# Patient Record
Sex: MALE | Race: WHITE | NOT HISPANIC OR LATINO | URBAN - METROPOLITAN AREA
[De-identification: names, ages, dates, MRNs, and addresses within clinical notes are randomized per-mention and may not be internally consistent; named-entity substitution may affect disease eponyms.]

---
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
## 2021-01-22 ENCOUNTER — RECORDS - HEALTHEAST (OUTPATIENT)
Dept: ADMINISTRATIVE | Facility: OTHER | Age: 21
End: 2021-01-22

## 2021-04-22 ENCOUNTER — RECORDS - HEALTHEAST (OUTPATIENT)
Dept: ADMINISTRATIVE | Facility: OTHER | Age: 21
End: 2021-04-22

## 2021-04-22 LAB
ALBUMIN (URINE) MG/SPEC: 60.9 MG/L
ALBUMIN/CREATININE RATIO: 50.76 MG/G (ref 0–30)
CHOLEST SERPL-MCNC: 189 MG/DL (ref 42–199)
CREATININE (URINE): 119.98 MG/DL (ref 40–278)
HDLC SERPL-MCNC: 53 MG/DL
LDLC SERPL CALC-MCNC: 123 MG/DL (ref 0–129)
TRIGLYCERIDES (HISTORICAL CONVERSION): 188 MG/DL (ref 0–199)

## 2021-04-30 ENCOUNTER — RECORDS - HEALTHEAST (OUTPATIENT)
Dept: HEALTH INFORMATION MANAGEMENT | Facility: CLINIC | Age: 21
End: 2021-04-30

## 2021-05-25 ENCOUNTER — RECORDS - HEALTHEAST (OUTPATIENT)
Dept: ADMINISTRATIVE | Facility: CLINIC | Age: 21
End: 2021-05-25

## 2021-05-30 VITALS — WEIGHT: 143 LBS

## 2021-05-30 VITALS — WEIGHT: 147.6 LBS

## 2021-05-30 NOTE — TELEPHONE ENCOUNTER
[facility] ([facility]) calling with critical lab. Glucose 49. Drawn at 11:11am. Pt is diabetic. On call doctor (Dr. Ingram) paged at 908 PM.   Pt was called at 910 PM. No answer. Attempted call again at 938 pm. LMTCB X2.     Aristides Chirinos RN [facility] Triage/Medication Refill

## 2021-05-30 NOTE — PROGRESS NOTES
[facility] Well Child Check    ASSESSMENT & PLAN  Quentin Shen is a 19 y.o. who has normal growth and normal development.    Diagnoses and all orders for this visit:    Health care maintenance  -     Meningococcal MCV4P  -     Hepatitis A vaccine Ped/Adol 2 dose IM (18yr & under)    Type 1 diabetes mellitus with hyperglycemia (H)  -     Glycosylated Hemoglobin A1c  -     Comprehensive Metabolic Panel  -     HM2(CBC w/o Differential)  -     Lipid Cascade RANDOM  -     Microalbumin, Random Urine    Mild persistent asthma without complication    Attention deficit hyperactivity disorder (ADHD), unspecified ADHD type    Other orders  -     HPV vaccine 9 valent 3 dose IM        Return to clinic in 1 year for a Well Child Check or sooner as needed    IMMUNIZATIONS/LABS  Immunizations were reviewed and orders were placed as appropriate.  I have discussed the risks and benefits of all of the vaccine components with the patient/parents.  All questions have been answered.    REFERRALS  Dental:  Recommend routine dental care as appropriate., The patient has already established care with a dentist.  Other:  No additional referrals were made at this time.    ANTICIPATORY GUIDANCE  I have reviewed age appropriate anticipatory guidance.    HEALTH HISTORY  Do you have any concerns that you'd like to discuss today?: No concerns   He has history of type 1 diabetes, autism, pervasive develop mental disorder, ADHD, adjustment disorder as well as asthma.  [facility]  He has an insulin pump.  He reports that he last saw his endocrinologist about 3 months ago.  Last records that we received was from November of last year however.  He reports that his blood sugars have been doing well and he feels that he is doing well with managing his diabetes.  He will be entering his senior year of high school in the fall.  He has been working at [employer] this summer.  He is also exercising  regularly as he prefers to try out for the football team.  He also plays baseball.  He sees a psychiatrist who prescribes his medications for treatment of ADHD.  He reports that he last saw his psychiatrist about 4 weeks ago and sees a psychiatrist about every 3 months.  Patient has history of mild persistent asthma.  Feels that asthma is well controlled with regular use of Flovent inhaler.  Uses albuterol inhaler before exercise and physical activity.  Otherwise does not need to use it frequently on a rescue basis.  Reviewed his medications and allergies.  Review of systems is assessed and is otherwise negative.  No other concerns or questions today.    Refills needed? No    Do you have any forms that need to be filled out? No        Do you have any significant health concerns in your family history?: No  No family history on file.  Since your last visit, have there been any major changes in your family, such as a move, job change, separation, divorce, or death in the family?: No  Has a lack of transportation kept you from medical appointments?: No    Home  Who lives in your home?:  Mom, brother, sister  Social History     Social History Narrative     Not on file     Do you have any concerns about losing your housing?: Yes  Is your housing safe and comfortable?: no  Do you have any trouble with sleep?:  No    Education  What school do you child attend?:  [employer]  What grade are you in?:  12th  How do you perform in school (grades, behavior, attention, homework?: no concerns     Eating  Do you eat regular meals including fruits and vegetables?:  yes  What are you drinking (cow's milk, water, soda, juice, sports drinks, energy drinks, etc)?: cow's milk- whole, water, juice, pop, engery drinks  Have you been worried that you don't have enough food?: No  Do you have concerns about your body or appearance?:  No    Activities  Do you have friends?:  yes  Do you get at least one hour of physical activity per day?:   "yes  How many hours a day are you in front of a screen other than for schoolwork (computer, TV, phone)?:  30 min  What do you do for exercise?:  Works out, bikes  Do you have interest/participate in community activities/volunteers/school sports?: involved in theater at school    MENTAL HEALTH SCREENING  PHQ-2 Total Score: 0 (1/8/2019 10:30 AM)    No data recorded    VISION/HEARING  Vision: Completed. See Results  Hearing:  Completed. See Results     Visual Acuity Screening    Right eye Left eye Both eyes   Without correction: 20/20 20/25-1 20/20   With correction:      Comments: Pass lens plus      TB Risk Assessment:  The patient and/or parent/guardian answer positive to:  patient and/or parent/guardian answer 'no' to all screening TB questions    Dyslipidemia Risk Screening  Have either of your parents or any of your grandparents had a stroke or heart attack before age 55?: No  Any parents with high cholesterol or currently taking medications to treat?: No     Dental  When was the last time you saw the dentist?: 1-3 months ago      Patient Active Problem List   Diagnosis     Type 1 Diabetes Mellitus     Asthma     Inquiry And Counseling: Concern About Behavior Of Child       Drugs  Does the patient use tobacco/alcohol/drugs?:  no    Safety  Does the patient have any safety concerns (peer or home)?:  no  Does the patient use safety belts, helmets and other safety equipment?:  yes    Sex  Have you ever had sex?:  No    MEASUREMENTS  Height:  5' 7.25\" (1.708 m)  Weight: 174 lb 8 oz (79.2 kg)  BMI: Body mass index is 27.13 kg/m .  Blood Pressure: 120/76  Blood pressure percentiles are not available for patients who are 18 years or older.    PHYSICAL EXAM  Physical Exam  Physical Examination: General appearance - alert, well appearing, and in no distress  Mental status - alert, oriented to person, place, and time  Eyes - pupils equal and reactive, extraocular eye movements intact  Ears - bilateral TM's and external ear " canals normal  Nose - normal and patent, no erythema, discharge or polyps  Mouth - mucous membranes moist, pharynx normal without lesions  Neck - supple, no significant adenopathy  Lymphatics - no palpable lymphadenopathy, no hepatosplenomegaly  Chest - clear to auscultation, no wheezes, rales or rhonchi, symmetric air entry  Heart - normal rate, regular rhythm, normal S1, S2, no murmurs, rubs, clicks or gallops  Abdomen - soft, nontender, nondistended, no masses or organomegaly   Male - no penile lesions or discharge, no testicular masses or tenderness, no hernias  Neurological - alert, oriented, normal speech, no focal findings or movement disorder noted  Musculoskeletal - no joint tenderness, deformity or swelling  Extremities - peripheral pulses normal, no pedal edema, no clubbing or cyanosis  Skin - normal coloration and turgor, no rashes, no suspicious skin lesions noted, large tattoo on upper back

## 2021-05-30 NOTE — TELEPHONE ENCOUNTER
----- Message from Regina Almaguer MD sent at 7/23/2019  5:53 PM CDT -----  Please let pt know that his A1c is 10.2 %, which means his diabetes is not under good control. I recommend that he schedule a follow up appointment with his endocrinologist as soon as possible to work on adjustments to improve control of his diabetes.

## 2021-05-30 NOTE — TELEPHONE ENCOUNTER
Pt called in,  Inform the Pt his blood glucose was 49.  Pt states his blood sugar is 129 at this time and he has no hypoglycemia symptom.  No other concern at this time.      Farhan Estrada RN, [facility] Triage/Med Refill 7/19/2019 10:13 PM

## 2021-05-31 VITALS — WEIGHT: 153.38 LBS | BODY MASS INDEX: 24.65 KG/M2 | HEIGHT: 66 IN
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
## 2021-05-31 NOTE — TELEPHONE ENCOUNTER
Spoke with , advised sports physical forms have been completed and are ready for  at the . Pt states he will  on Friday 8/23/2019.
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
## 2021-05-31 NOTE — TELEPHONE ENCOUNTER
Patient dropped off sports physical form for Dr. Almaguer to fill out. Please call when ready to be picked up. [phone].

## 2021-06-01 VITALS — HEIGHT: 67 IN | WEIGHT: 162.1 LBS | BODY MASS INDEX: 25.44 KG/M2

## 2021-06-01 VITALS — BODY MASS INDEX: 26.02 KG/M2 | HEIGHT: 67 IN | WEIGHT: 165.8 LBS

## 2021-06-01 VITALS — BODY MASS INDEX: 25.88 KG/M2 | HEIGHT: 66 IN | WEIGHT: 161 LBS

## 2021-06-02 VITALS — BODY MASS INDEX: 26.39 KG/M2 | WEIGHT: 166 LBS

## 2021-06-02 VITALS — HEIGHT: 67 IN | BODY MASS INDEX: 26.37 KG/M2 | WEIGHT: 168 LBS

## 2021-06-03 VITALS — HEIGHT: 67 IN | WEIGHT: 174.5 LBS | BODY MASS INDEX: 27.39 KG/M2

## 2021-06-03 VITALS — BODY MASS INDEX: 28.29 KG/M2 | WEIGHT: 182 LBS

## 2021-06-08 NOTE — PROGRESS NOTES
Subjective:      Quentin Shen is a 16 y.o. male here with mom for evaluation of sore throat that started last night. Pain with swallowing, appetite decreased but drinking well, no N/V/D, no stomach ache. Does c/o headache along with some mild cold symptoms starting up today. No fevers, chills or body aches, patient low-grade temp in clinic. Has taken some Ibuprofen earlier today for the headache with relief. No other ill contacts at home.  Hx of intermittent asthma, Albuterol prn, is well controlled, has not needed inhaler for quite awhile     Objective:     Vitals:    02/13/17 1709   BP: 112/70   Pulse: 138   Resp: 14   Temp: 100.3  F (37.9  C)   SpO2: 97%       General: Alert, interactive, NAD, cooperative on exam  Eyes: PERRLA, EOMI, conjunctivae clear.   Ears: Right TM; pink and translucent. Left TM; pink and translucent   Nose:  Nasal mucosa erythema and mild inflammation. Clear mucus. No maxillary or frontal sinus tenderness  Mouth/Throat:  Tonsillar hypertrophy, 1+, erythema, no exudate. Uvula midline. Posterior pharynx erythematous with some postnasal drainage.  Mucus membranes pink and moist, free of lesions.  Neck: Supple, symmetrical, trachea midline, no adenopathy   Lungs:   No cough demonstrated. CTA bilaterally, good air movement throughout. No rales, rhonchi or wheezing  Heart:: Regular rate and rhythm, S1, S2 normal, no murmur, click, rub or gallop       Results for orders placed or performed in visit on 02/13/17   Rapid Strep A Screen-Throat   Result Value Ref Range    Rapid Strep A Antigen No Group A Strep detected No Group A Strep detected        Assessment/Plan:      1. Viral respiratory illness    2. Throat pain  - Rapid Strep A Screen-Throat  - Group A Strep, RNA Direct Detection, Throat     I reviewed exam and lab findings with mom. Will contact parents in next 24-48 hours if strep confirmatory test positive, would prescribe antibiotics at that time. Dicussed contagious precautions just  in case. If strep negative, likely viral illness that will resolve spontaneously. Recommended supportive cares; nasal saline, elevating hob, humidified air. Advised plenty of fluids and rest. Tylenol or Ibuprofen prn for fever/discomfort. If symptoms not improved in next 5-7 days, f/u with PCP, sooner if worsening.    -Patient instructions given.

## 2021-06-09 NOTE — PROGRESS NOTES
ASSESSMENT:   1. Influenza-like illness     2. Sore throat  Rapid Strep A Screen-Throat    Influenza A/B Rapid Test    Group A Strep, RNA Direct Detection, Throat      PLAN:  Influenza testing is negative, though brother positive for influenza B and entire family has similar symptoms.I suspect this clinically represents influenza.  He is out of treatment window for antivirals.   Discussed viral etiology of influenza, contagion precautions discussed.  Advised no return to school until  fever-free (temperature <100) for 24 hours.  May use ibuprofen or tylenol for comfort and fever. Proair inhaler every 4-6 hours as needed for cough/wheezing.  Given spacer in clinic and instructed on use by nursing staff.   Advised adequate rest and hydration. F/u with PCP if not improving in 5-7 days, sooner in the ER if signs of respiratory distress or he becomes more ill.       SUBJECTIVE:   Quentin Shen is a 16 y.o. male presents today, with his mother, with 4 days complaint of cough. Cough is mostly dry. He also complains of sore throat, fatigue, temperature up to 100.2, body aches, headache, nasal congestion.  He felt constipated Monday, drank orange juice, and had a good bowel movement. Appetite has been fairly normal.   Sick contacts: entire household has similar symptoms. Has tried ibuprofen with minimal relief.  He is type I diabetic, mom states blood sugars have been normal. His appetite has been fairly normal. He has mild intermittent asthma - used his Proair inhaler twice yesterday which was helpful.    Denies abdominal pain, nausea, vomiting, diarrhea, difficulty breathing, rash    Patient Active Problem List   Diagnosis     Type 1 Diabetes Mellitus     Mild Intermittent Asthma     Inquiry And Counseling: Concern About Behavior Of Child       History   Smoking Status     Never Smoker   Smokeless Tobacco     Never Used       Current Medications:  Current Outpatient Prescriptions on File Prior to Visit   Medication Sig  Dispense Refill     ACETONE, URINE, TEST strip CHECK URINE KETONES FOR ELEVATED BLOOD SUGARS AND OR ILLNESS  11     albuterol (PROVENTIL) 2.5 mg /3 mL (0.083 %) nebulizer solution Take 3 mL (2.5 mg total) by nebulization every 4 (four) hours. X 4 days, then use every 4-6 hours as needed for cough or wheezing 30 vial 1     DULoxetine (CYMBALTA) 20 MG capsule        GLUCAGON 1 mg injection 1 MG INTRAMUSCULAR ONCE  11     hydrocortisone 2.5 % cream Apply small amount to affected skin 2-3 times daily for up to 14 days. 30 g 0     LEVEMIR FLEXTOUCH 100 unit/mL (3 mL) pen        methylphenidate (CONCERTA) 54 MG CR tablet        NOVOLOG PENFILL 100 unit/mL Crtg        ONETOUCH ULTRA TEST Strp        No current facility-administered medications on file prior to visit.        Allergies:   Allergies   Allergen Reactions     Amoxicillin Rash     Penicillins Rash       OBJECTIVE:   Vitals:    03/22/17 1426   BP: 114/64   Pulse: 105   Resp: 20   Temp: 98.8  F (37.1  C)   TempSrc: Oral   SpO2: 100%   Weight: 147 lb 9.6 oz (67 kg)     Physical exam reveals a 16 y.o. male.   Appears ill and fatigued.  Alert, cooperative, answers questions appropriately.   Eyes: no conjunctivitis, lids normal.   Ears:  normal TMs bilaterally  Nose:    Mucosa normal. Clear rhinorrhea.   Mouth:  Mucosa pink and moist. Moderate erythema of posterior pharynx. No exudate or palatal petechiae. Uvula is midline.   Lymph: small anterior cervical LAD  Lungs: Chest is clear, no wheezing, rhonchi, or rales. Symmetric air entry throughout both lung fields.  Heart: regular rate and rhythm, no murmur, rub or gallop  Abdomen: soft, nontender. No masses or hepatosplenomegaly  Skin: pink, warm, dry. No lesions/rash     Recent Results (from the past 24 hour(s))   Rapid Strep A Screen-Throat   Result Value Ref Range    Rapid Strep A Antigen No Group A Strep detected No Group A Strep detected   Influenza A/B Rapid Test   Result Value Ref Range    Influenza  A, Rapid  Antigen No Influenza A antigen detected No Influenza A antigen detected    Influenza B, Rapid Antigen No Influenza B antigen detected No Influenza B antigen detected

## 2021-06-11 NOTE — PROGRESS NOTES
Assessment/Plan:      Visit for Preoperative Exam.     Patient approved for surgery with general or local anesthesia. Labs will be done as indicated. Above recommendations were reviewed with the patient. Follow up as needed. No Cardiology consultation or non-invasive testing. No active cardiac conditions.     Continue current medications.  He will see endocrinologist next week and will discuss insulin adjustments with that provider.    Hemoglobin today is normal.    Subjective:     Scheduled Procedure: Right ankle tib / fib FX  Surgery Date:  TBD  Surgery Location:  TBD  Surgeon:  TBD    17-year-old male comes in today accompanied by his mother for preoperative exam.  He sustained a right distal tibia and fibula fracture.  This occurred on June 8, 2017.  He was seen at the emergency department at [facility] in [place].  Injury occurred while playing baseball.  He was running towards first base and rolled his ankle.  He fell and heard a crack.  He was not able to bear weight and was taken to the emergency room for evaluation.  X-rays showed presence of fractures of the distal tibia and fibula.  He was placed in a splint and was given crutches.  He did have orthopedic follow-up and surgery has been recommended although they are seeking a second opinion before proceeding with surgery.  They are concerned about possibility of infection and healing given his underlying type 1 diabetes.  He is scheduled to see his endocrinologist 1 week from today.  He is currently on Levemir and NovoLog insulin.  He is also on duloxetine and Concerta which is prescribed by his psychiatrist.  He also has history of exercise-induced asthma.  Uses an albuterol inhaler as needed before physical activity.  We reviewed allergies and medications.  He has no other concerns or questions today.    Current Outpatient Prescriptions   Medication Sig Dispense Refill     ACETONE, URINE, TEST strip CHECK URINE KETONES FOR ELEVATED BLOOD SUGARS  AND OR ILLNESS  11     albuterol (PROVENTIL) 2.5 mg /3 mL (0.083 %) nebulizer solution Take 3 mL (2.5 mg total) by nebulization every 4 (four) hours. X 4 days, then use every 4-6 hours as needed for cough or wheezing 30 vial 1     DULoxetine (CYMBALTA) 30 MG capsule TAKE 1 CAPSULE BY MOUTH ONCE A DAY  3     GLUCAGON 1 mg injection 1 MG INTRAMUSCULAR ONCE  11     LEVEMIR FLEXTOUCH 100 unit/mL (3 mL) pen        methylphenidate (CONCERTA) 36 MG CR tablet TK 2 TS PO QD  0     NOVOLOG PENFILL 100 unit/mL Crtg        ONETOUCH ULTRA TEST Strp        HYDROcodone-acetaminophen 5-325 mg per tablet Take 1 tablet by mouth every 6 (six) hours as needed for pain. 40 tablet 0     No current facility-administered medications for this visit.        Allergies   Allergen Reactions     Amoxicillin Rash     Penicillins Rash       Immunization History   Administered Date(s) Administered     DTaP, historic 2001, 2001, 2001, 09/20/2002, 05/26/2006     HPV 9 Valent 03/16/2016, 07/20/2016     Hep A, historic 08/18/2014     Hep B, historic 2001, 2001, 09/20/2002     Hepatitis A, Ped/adol 2 Dose 03/16/2016     HiB, historic 2001, 2001, 09/20/2002     IPV 2001, 2001, 2001, 05/26/2006     Influenza V5d6-19, 10/28/2009     Influenza, inj, historic 11/01/2011     Influenza, seasonal,quad inj 6-35 mos 12/01/2008, 09/28/2009, 10/01/2010     MMR 06/21/2002, 05/26/2006     Meningococcal MCV4P 08/18/2014     Tdap 08/18/2014     Varicella 06/21/2002, 09/06/2007       Patient Active Problem List   Diagnosis     Type 1 Diabetes Mellitus     Mild Intermittent Asthma     Inquiry And Counseling: Concern About Behavior Of Child       Past Medical History:   Diagnosis Date     Mild intermittent asthma      Type 1 diabetes        Social History     Social History     Marital status: Single     Spouse name: N/A     Number of children: N/A     Years of education: N/A     Occupational History     Not on  "file.     Social History Main Topics     Smoking status: Never Smoker     Smokeless tobacco: Never Used     Alcohol use No     Drug use: No     Sexual activity: No     Other Topics Concern     Not on file     Social History Narrative       Past Surgical History:   Procedure Laterality Date     APPENDECTOMY         History of Present Illness  Recent Health  Fever: no  Chills: no  Fatigue: no  Chest Pain: no  Cough: no  Dyspnea: no  Urinary Frequency: no  Nausea: no  Vomiting: no  Diarrhea: no  Abdominal Pain: no  Easy Bruising: no  Lower Extremity Swelling: yes--right leg due to fracture  Poor Exercise Tolerance: no    Pertinent History  Prior Anesthesia: yes  Previous Anesthesia Reaction:  no  Diabetes: yes  Cardiovascular Disease: no  Pulmonary Disease: yes--Asthma  Renal Disease: no  GI Disease: no  Sleep Apnea: no  Thromboembolic Problems: no  Clotting Disorder: no  Bleeding Disorder: no  Transfusion Reaction: no  Impaired Immunity: no  Steroid use in the last 6 months: no  Frequent Aspirin use: no    Family history of no pertinent family history    Social history of there are no concerns regarding care after surgery    After surgery, the patient plans to recover at home with family.    Review of Systems  Pertinent items are noted in HPI.          Objective:         Vitals:    06/12/17 1415   BP: 120/68   Pulse: 90   Temp: 98.2  F (36.8  C)   TempSrc: Tympanic   SpO2: 100%   Weight: 153 lb 6 oz (69.6 kg)   Height: 5' 6\" (1.676 m)       Physical Exam:  Physical Exam:  General Appearance: Alert, cooperative, no distress, appears stated age  Head: Normocephalic, without obvious abnormality, atraumatic  Eyes: PERRL, conjunctiva/corneas clear, EOM's intact  Ears: Normal TM's and external ear canals, both ears  Nose: Nares normal, septum midline,mucosa normal, no drainage  Throat: Lips, mucosa, and tongue normal; teeth and gums normal  Neck: Supple, symmetrical, trachea midline, no adenopathy;  thyroid: not enlarged, " symmetric, no tenderness/mass/nodules;  Back: Symmetric, no curvature  Lungs: Clear to auscultation bilaterally, respirations unlabored  Heart: Regular rate and rhythm, S1 and S2 normal, no murmur, rub, or gallop,  Abdomen: Soft, non-tender, bowel sounds active all four quadrants,  no masses, no organomegaly  Musculoskeletal: Normal range of motion. Right lower extremity in walking boot.  Extremities: Extremities normal, atraumatic, no cyanosis or edema  Skin: Skin color, texture, turgor normal, no rashes or lesions  Lymph nodes: Cervical, supraclavicular nodes normal  Neurologic: He is alert. He has normal reflexes.   Psychiatric: He has a normal mood and affect.

## 2021-06-16 NOTE — PROGRESS NOTES
Assessment:    Asthma with exercise, illness, allergy trigger.  Current active airway obstruction.    Plan:    Based on this I would recommend using a daily controller medicine such as Qvar 80-2 puffs daily  Albuterol 2 puffs every 4 hours as needed.  Follow-up in 4-6 months for repeat   ____________________________________________________________________________     Len comes in today with his mother for evaluation of asthma.  Recently was seen for a sports physical.  There is noted that his asthma was active.  On spirometry he had active airway obstruction.  It was recommended that he come in today for further evaluation of his asthma and allergies.  He reports having a long history of asthma for years.  He identifies exercise, dry air, illness and allergies as triggers.  Generally his asthma manifest as wheezing and shortness of breath.  He did go to the emergency room in December after developing influenza A infection and having subsequent wheezing shortness of breath.  He gets allergy symptoms including sneezing rhinorrhea and congestion.  His symptoms are worse in the spring and fall.  Patient has albuterol which she uses rarely.  He estimates using a 1 or 2 times per year.  He does have asthma symptoms more frequent in this.  Although he reports other than the flare in December, he has not had active asthma symptoms in the last several months.    Review of symptoms:  As above, otherwise negative    Past medical history: Type 1 diabetes.  ADHD.  History of tympanostomy tubes    Allergies: No known allergies to latex, foods or hymenoptera venom.  Allergy to amoxicillin    Family history: Grandmother with asthma    Social history: Currently lives in a house with forced air heat.  No visible water seepage or mole.  There is a The home.  No significant cigarette smoke exposure.  No basement bedroom.    Medications: reviewed in chart    Physical Exam:  General:  Alert and in no apparent distress.  Eyes:  Sclera  clear.  Ears: TMs translucent grey with bony landmarks visible. Nose: Pale, boggy mucosal membranes.  Throat: Pink, moist.  No lesions.  Neck: Supple.  No lymphadenopathy.  Lungs: CTA.  CV: Regular rate and rhythm. Extremities: Well perfused.  No clubbing or cyanosis. Skin: No rash    Nitric oxide is 9 ppb    Spirometry: FEV1 to FVC ratio 73%.  FEV1 is 3.7 L which is 100% of predicted.  FVC is 5.08 L which is 118% of predicted.  This is consistent with mild airway obstruction.    30 min spent in direct contact with the patient apart from testing.  More than 50% in counseling and coordination of care.

## 2021-06-16 NOTE — PROGRESS NOTES
Assessment:      Satisfactory school sports physical exam.     2.  Mild intermittent asthma.   Due to ER visit this winter and his comments on not having an inhaler on hand when he exercises, I recommend that he see an asthma specialist for sports clearance.  Mom is unhappy with this, but I told her that I am concerned for his safety considering the spirometry shows mild obstruction without him exercising.  I will complete the form once clearance is obtained and education is provided about medication compliance.   Plan:      Permission granted to participate in athletics without restrictions. Form signed and returned to patient.  Anticipatory guidance: Specific topics reviewed: bicycle helmets, breast self-exam, drugs, ETOH, and tobacco, importance of regular dental care, importance of regular exercise, importance of varied diet, minimize junk food, seat belts, sex; STD and pregnancy prevention and testicular self-exam.     Subjective:       Quentin Shen is a 17 y.o. male who presents for a school sports physical exam. Patient/parent deny any current health related concerns.  He plans to participate in baseball.    He has type 1 diabetes and sees Endocrinology at [facility].  His last A1C was 7.3% 6/13/17.  He denies symptoms of hypoglycemia.     He has intermittent asthma.  Patient states it is primarily exercise induced but he had a flare resulting in an ER visit this winter.  He did not have an inhaler at home until he was seen in the ER.  He denies recent cold symptoms.      Immunization History   Administered Date(s) Administered     DTaP, historic 2001, 2001, 2001, 09/20/2002, 05/26/2006     Dtap 2001, 2001, 2001, 09/20/2002, 05/26/2006     HPV 9 Valent 03/16/2016, 07/20/2016     Hep A, Adult IM (19yr & older) 08/18/2014     Hep A, historic 08/18/2014     Hep B, Adult 2001, 2001, 09/20/2002     Hep B, historic 2001, 2001, 09/20/2002      "Hepatitis A, Ped/Adol 2 Dose IM (18yr & under) 03/16/2016     HiB, historic,unspecified 2001, 2001, 09/20/2002     Hib (PRP-OMP) 2001, 2001, 09/20/2002     IPV 2001, 2001, 2001, 05/26/2006     Influenza Q5n9-71, 10/28/2009     Influenza, Seasonal, Inj PF IIV3 10/01/2010, 12/20/2012     Influenza, inj, historic,unspecified 11/01/2011     Influenza, seasonal,quad inj 6-35 mos 12/01/2008, 09/28/2009, 10/01/2010, 10/28/2013     Influenza,seasonal quad, PF, 6-35MOS 10/28/2013     MMR 06/21/2002, 05/26/2006     Meningococcal MCV4O 08/18/2014     Meningococcal MCV4P 08/18/2014     Tdap 08/18/2014     Varicella 06/21/2002, 09/06/2007       The following portions of the patient's history were reviewed and updated as appropriate: allergies, current medications, past family history, past medical history, past social history, past surgical history and problem list.    Review of Systems  Pertinent items are noted in HPI      Objective:      /78  Pulse 88  Ht 5' 7\" (1.702 m)  Wt 162 lb 1.6 oz (73.5 kg)  BMI 25.39 kg/m2    General Appearance:  Alert, cooperative, no distress, appropriate for age                             Head:  Normocephalic, no obvious abnormality                              Eyes:  PERRL, EOM's intact, conjunctiva and corneas clear, fundi benign, both eyes                              Nose:  Nares symmetrical, septum midline, mucosa pink, clear watery discharge; no sinus tenderness                           Throat:  Lips, tongue, and mucosa are moist, pink, and intact; teeth intact                              Neck:  Supple, symmetrical, trachea midline, no adenopathy; thyroid: no enlargement, symmetric,no tenderness/mass/nodules; no carotid bruit, no JVD                              Back:  Symmetrical, no curvature, ROM normal, no CVA tenderness                Chest/Breast:  No mass or tenderness                            Lungs:  Clear to auscultation " bilaterally, respirations unlabored                              Heart:  Normal PMI, regular rate & rhythm, S1 and S2 normal, no murmurs, rubs, or gallops                      Abdomen:  Soft, non-tender, bowel sounds active all four quadrants, no mass, or organomegaly               Genitourinary:  deferre          Musculoskeletal:  Tone and strength strong and symmetrical, all extremities                     Lymphatic:  No adenopathy             Skin/Hair/Nails:  Skin warm, dry, and intact, no rashes or abnormal dyspigmentation                   Neurologic:  Alert and oriented x3, no cranial nerve deficits, normal strength and tone, gait steady    I ordered and personally reviewed a spirometry showing mild airway obstruction.  Please see form for further details.

## 2021-06-17 NOTE — PATIENT INSTRUCTIONS - HE
Patient Instructions by Regina Almaguer MD at 7/19/2019 10:20 AM     Author: Regina Almaguer MD Service: -- Author Type: Physician    Filed: 7/19/2019 10:57 AM Encounter Date: 7/19/2019 Status: Signed    : Regina Almaguer MD (Physician)         Patient Education             [facility] Patient Handout   18 to 21 Year Visits     Your Daily Life    Visit the dentist at least twice a year.    Protect your hearing at work, home, and concerts.    Eat a variety of healthy foods.    Eat breakfast every morning.    Drink plenty of water.    Make sure to get enough calcium.    Have 3 or more servings of low-fat (1%) or fat-free milk and other low-fat dairy products each day.    Aim for 1 hour of vigorous physical activity.    Be proud of yourself when you do something well.  Healthy Behavior Choices    Support friends who choose not to use drugs, alcohol, tobacco, steroids, or diet pills.    If you use drugs or alcohol, you can talk to us about it. We can help you with quitting or cutting down on your use.    Make healthy decisions about your sexual behavior.    If you are sexually active, always practice safe sex. Always use a condom to prevent STIs.    All sexual activity should be something you want. No one should ever force or try to convince you.    Find safe activities at school and in the community. Violence and Injuries    Do not drink and drive or ride in a vehicle with someone who has been using drugs or alcohol.    If you feel unsafe driving or riding with someone, call someone you trust to drive you.    Always wear a seat belt in the car.    Know the rules for safe driving.    Never allow physical harm of yourself or others at home or school.    Always deal with conflict using nonviolence.    Remember that healthy dating relationships are built on respect and that saying no is OK.    Fighting and carrying weapons can be dangerous.  Your Feelings    Figure out healthy ways to deal with stress.    Try your  best to solve problems and make decisions on your own.    Most people have daily ups and downs. But if you are feeling sad, depressed, nervous, irritable, hopeless, or angry, talk with me or another health professional.    We understand sexuality is an important part of your development. If you have any questions or concerns, we are here for you. School and Friends    Take responsibility for being organized enough to succeed in work or school.    Find new activities you enjoy.    Consider volunteering and helping others in the community on an issue that interests or concerns you.    Form healthy friendships and find fun, safe things to do with friends.    As you get older, making and keeping friends is important. You may find that you drift away from some of your old friends--thats normal.    Evaluate your friendships and keep those that are healthy.    It is still important to stay connected with your family.

## 2021-06-19 NOTE — PROGRESS NOTES
Assessment:     Asthma with exercise, illness, allergy trigger.  Current active airway obstruction.     Plan:  Continue Flovent 110-2 puffs daily  Albuterol 2 puffs every 4 hours as needed.  Recommend using 2 puffs 20 minutes before exercise.  Follow-up in 6 months  ____________________________________________________________________________     Patient comes in today for follow-up of asthma.  He was seen 4 months ago.  At that time had some airway obstruction on spirometry.  He was starting Qvar 80 2 puffs daily.  He has been using it irregularly.  In the past 2 weeks he has not used it at all.  Overall he feels his asthma symptoms are well controlled although it does have shortness of breath with exertion.  He does not pretreat with albuterol.    Physical Exam:  General:  Alert and Oriented.  Eyes:  Sclera clear. Nose: pale boggy mucosal membranes.  Throat:  pink moist, no lesions.  Lungs:  clear to auscultation. Skin:  no rashes    Spirometry: FEV1 to FVC ratio 70%.  FEV1 is 3.75 L which is 97% predicted.  FVC is 5.34 L which is 122% predicted.  This is consistent with mild airway obstruction    Nitric oxide is 17 ppb

## 2021-06-19 NOTE — LETTER
Letter by Regina Almaguer MD at      Author: Regina Almaguer MD Service: -- Author Type: --    Filed:  Encounter Date: 7/23/2019 Status: (Other)         Quentin Shen  [address]  [place] [address]             July 23, 2019         Dear Mr. Shen,    Below are the results from your recent visit:    Resulted Orders   Glycosylated Hemoglobin A1c   Result Value Ref Range    Hemoglobin A1c 10.2 (H) 3.5 - 6.0 %   Comprehensive Metabolic Panel   Result Value Ref Range    Sodium 142 136 - 145 mmol/L    Potassium 4.2 3.5 - 5.0 mmol/L    Chloride 104 98 - 107 mmol/L    CO2 27 22 - 31 mmol/L    Anion Gap, Calculation 11 5 - 18 mmol/L    Glucose 49 (LL) 70 - 125 mg/dL    BUN 10 8 - 22 mg/dL    Creatinine 0.78 0.70 - 1.30 mg/dL    GFR MDRD Af Amer >60 >60 mL/min/1.73m2    GFR MDRD Non Af Amer >60 >60 mL/min/1.73m2    Bilirubin, Total 0.4 0.0 - 1.0 mg/dL    Calcium 9.8 8.5 - 10.5 mg/dL    Protein, Total 7.2 6.0 - 8.0 g/dL    Albumin 4.0 3.5 - 5.0 g/dL    Alkaline Phosphatase 138 50 - 364 U/L    AST 33 0 - 40 U/L    ALT 26 0 - 45 U/L    Narrative    Fasting Glucose reference range is 70-99 mg/dL per  American Diabetes Association (ADA) guidelines.   HM2(CBC w/o Differential)   Result Value Ref Range    WBC 5.5 4.0 - 11.0 thou/uL    RBC 5.09 4.40 - 6.20 mill/uL    Hemoglobin 15.2 14.0 - 18.0 g/dL    Hematocrit 45.6 40.0 - 54.0 %    MCV 90 80 - 100 fL    MCH 29.9 27.0 - 34.0 pg    MCHC 33.4 32.0 - 36.0 g/dL    RDW 11.7 11.0 - 14.5 %    Platelets 276 140 - 440 thou/uL    MPV 7.3 7.0 - 10.0 fL   Lipid Cascade RANDOM   Result Value Ref Range    Cholesterol 175 <=199 mg/dL    Triglycerides 59 <=149 mg/dL    HDL Cholesterol 60 >=40 mg/dL    LDL Calculated 103 <=129 mg/dL    Patient Fasting > 8hrs? Yes    Microalbumin, Random Urine   Result Value Ref Range    Microalbumin, Random Urine 1.01 0.00 - 1.99 mg/dL    Creatinine, Urine 84.3 mg/dL    Microalbumin/Creatinine Ratio Random Urine 12.0 <=19.9 mg/g    Narrative    Microalbumin,  Random Urine  <2.0 mg/dL . . . . . . . . Normal  3.0-30.0 mg/dL . . . . . . Microalbuminuria  >30.0 mg/dL . . . . . .  . Clinical Proteinuria    Microalbumin/Creatinine Ratio, Random Urine  <20 mg/g . . . . .. . . . Normal   mg/g . . . . . . . Microalbuminuria  >300 mg/g . . . . . . . . Clinical Proteinuria           Blood counts are normal. Cholesterol levels are okay. Kidney function is normal. A1c is 10.2 % which means your diabetes is not under good control. I recommend that you schedule a follow up appointment with your endocrinologist as soon as possible to work on insulin adjustments to improve control of your diabetes.     Please call with questions or contact us using [employer].    Sincerely,        Electronically signed by Regina Almaguer MD

## 2021-06-19 NOTE — LETTER
Letter by Regina Almaguer MD at      Author: Regina Almaguer MD Service: -- Author Type: --    Filed:  Encounter Date: 7/19/2019 Status: (Other)       Asthma Action Plan    Patient Name: Quentin Shen  Patient YOB: 2000    Doctor's Name: Regina Almaguer    Emergency Contact:              Severity Classification: Persistent    What triggers my asthma: colds and weather    Always use a spacer with your inhaler, if prescribed    My child may carry, self administer and use quick-relief medicine at school with approval from the school nurse.    GREEN ZONE: Doing Well   No cough, wheeze, chest tightness or shortness of breath during the day or night  Can do your usual activities    Take these long-term-control medicines every day  Medicine How Much to Take When to take it   Flovent 2 puffs 2 times daily       Take these medicines before exercise if your asthma is exercise-induced:  Medicine How Much to Take When to take it   albuterol  (also known as ProAir, Ventolin and Proventil) 2 puffs with inhaler or   1 nebulizer treatment 15-30 minutes prior to exercise or sports     YELLOW ZONE: Asthma is Getting Worse   Cough, wheeze, chest tightness or shortness of breath or  Waking at night due to asthma, or  Can do some, but not all, usual activities.    Keep taking green zone medications and add quick-relief medicine:  Quick Relief Medicine How Much to Take When to take it   albuterol  (also known as ProAir, Ventolin and Proventil) 2 puffs with inhaler or   1 nebulizer treatment every 4 hours as needed     If you do not feel better and your symptoms do not return to the green zone after one hour of the quick relief medication, then:    Take quick relief treatment again. Call your clinician within 1 hour.    Contact your clinician if you are using quick relief medication more than 2 times per week.    RED ZONE: Medical Alert!   Very short of breath, or  Quick relief medications have not helped, or  Cannot do usual  activities, or  Symptoms are same or worse after 24 hours in the Yellow Zone.    Continue green zone medicines and add:  Quick Relief Medicine Dose When to take it   albuterol  (also known as ProAir, Ventolin and Proventil) 2 puffs with inhaler  or  1 nebulizer treament may repeat every 20 minutes for up to 1 hour       IF ANY OF THESE ARE HAPPENING, SEEK EMERGENCY HELP AND CALL 911!   Your child is struggling to breathe and is clearly uncomfortable or  There is simply no clear improvement and you are worried about how to get through the next 30 minutes or  Trouble walking and talking due to shortness of breath, or  Lips or fingernails are blue    Provider signature:  Electronically Signed by Regina Almaguer   Date: 07/19/19        Parent signature:                                                        Date:  __________________

## 2021-06-22 NOTE — PROGRESS NOTES
Assessment/Plan:     Patient resents to clinic with symptoms consistent with an upper respiratory tract infection.  He is currently tried no over-the-counter medications to treat this.  I do agree with patient that fixing the humidifier obtaining a new one may be beneficial given that his main concern is dryness that makes it difficult to sleep in his nasal passages.  He had a negative rapid strep pharyngitis swab, culture pending.  Handout was provided on symptomatic treatment and return precautions discussed.    Problem List Items Addressed This Visit     None      Visit Diagnoses     Throat pain    -  Primary    Relevant Orders    Rapid Strep A Screen- Throat Swab (Completed)    Group A Strep, RNA Direct Detection, Throat    Viral upper respiratory tract infection              Return to clinic in 1 year for physical.     Total time spent with patient was 15 minutes with greater than 50% spent in face-to-face counseling regarding the above plan.    Subjective:      Quentin Shen is a 18 y.o. male who presents to clinic for sore throat.    Patient complains of sore throat. He also complains of nasal congestion, occasional streaks of blood in nasal mucous.  Patient is had symptoms for approximately 2 days.  It is worsening.  Patient denies productive cough and denies fever.  Patient is not having trouble breathing but does have some trouble swallowing.  Patient has tried a humidifier but it broke for symptomatic relief.  Patient has not been exposed to someone with known strep pharyngitis.  There are no other sick contacts.    Past Medical History, Family History, and Social History reviewed. Patient does have a history of DM I and asthma.     Current Outpatient Medications on File Prior to Visit   Medication Sig Dispense Refill     ACETONE, URINE, TEST strip CHECK URINE KETONES FOR ELEVATED BLOOD SUGARS AND OR ILLNESS  11     albuterol (PROAIR HFA;PROVENTIL HFA;VENTOLIN HFA) 90 mcg/actuation inhaler Inhale 2  "puffs every 4 (four) hours as needed. 1 Inhaler 0     albuterol (PROVENTIL) 2.5 mg /3 mL (0.083 %) nebulizer solution Take 3 mL (2.5 mg total) by nebulization every 4 (four) hours. X 4 days, then use every 4-6 hours as needed for cough or wheezing 30 vial 1     BD VEO INSULIN SYR HALF UNIT 0.3 mL 31 gauge x 15/64\" Syrg USES UP TO 8 SYRINGES DAILY FOR INSULIN INJECTIONS.  11     dextroamphetamine (DEXTROSTAT) 10 MG tablet TK 1 T PO  QD AROUND 4-5 PM  0     DULoxetine (CYMBALTA) 60 MG capsule TK ONE C PO  D.  2     fluticasone (FLOVENT HFA) 110 mcg/actuation inhaler Inhale 2 puffs 2 (two) times a day. 1 Inhaler 6     GLUCAGON 1 mg injection 1 MG INTRAMUSCULAR ONCE  11     LEVEMIR FLEXTOUCH 100 unit/mL (3 mL) pen        NOVOLOG PENFILL 100 unit/mL Crtg        ONETOUCH ULTRA TEST Strp        VYVANSE 50 mg capsule        No current facility-administered medications on file prior to visit.        Review of systems is as stated in HPI.  The remainder of the 10 system review is otherwise negative.    Objective:     /80   Pulse 100   Temp 98.8  F (37.1  C)   Ht 5' 6.5\" (1.689 m)   Wt 168 lb (76.2 kg)   SpO2 97%   BMI 26.71 kg/m   Body mass index is 26.71 kg/m .    GEN: NAD  Lymph: trace bilateral submandibular adenopaty  HEENT: trace pharyngeal erythema, tonsils are slightly swollen, no exudate appreciated    This note has been dictated using voice recognition software. Any grammatical or context distortions are unintentional and inherent to the the software.     Tayler Pathak MD      "

## 2021-06-22 NOTE — PATIENT INSTRUCTIONS - HE
TREATMENT OF SORE THROAT  Sore throat treatment: Antibiotics do not help throat pain caused by a virus and are not recommended as they only for bacterial infections. Most sore throats are caused by viruses. Inappropriate use of antibiotics for viral illness can unnecessarily expose patients to side effects like diarrhea, rash, or more serious allergic reactions. Sore throat caused by viral infections usually lasts four to five days. During this time, treatments to reduce pain may be helpful. Several therapies can help to relieve throat pain.  Pain medication -- Over the counter pain relievers such as acetaminophen (Tylenol ) or a non-steroidal anti-inflammatory agent such as ibuprofen or naproxen (Motrin  or Aleve ) have been shown to provide fast and effective relief of sore throat pain.  Oral rinses -- Salt-water gargles are an old stand-by for throat pain. It is not clear that salt water works to relieve pain, but it is unlikely to be harmful. Most recipes suggest 1/4 to 1/2 teaspoon (1.5 to 3.0 g) of salt per one cup (8 ounces or 250 mL) of warm water.   Sprays -- Sprays containing topical anesthetics (eg, benzocaine, phenol) are available to treat sore throat. However, such sprays are no more effective than sucking on hard candy and it is important not to swallow the liquid.  Lozenges -- A variety of lozenges (cough drops) containing topical anesthetics are available to treat throat pain or relieve dryness Lozenges may persist longer in the throat than sprays or gargles and thus, may be more effective for symptom relief [2].  Other treatments -- Other treatments that may help with throat pain include sipping warm beverages (eg, honey or lemon tea, chicken soup), cold beverages, or eating cold or frozen desserts (eg, ice cream, popsicles).  Honey - Honey is a natural mucolytic and can make sore throats with phlegm feel better. Many patients like to mix it with lemon or lemon tea.    Dr. Hanley's recipe:  - 1  tablespoon lemon juice  - 3-4 tablespoons water  - honey (about a teaspoon but stanton to taste)  Heat in a small glass and try before bedtime. Faith with the recipe to your taste preference.

## 2021-06-26 NOTE — PROGRESS NOTES
"Progress Notes by José Luis Weir PA-C at 9/3/2018 11:00 AM     Author: José Luis Weir PA-C Service: -- Author Type: Physician Assistant    Filed: 9/3/2018 12:28 PM Encounter Date: 9/3/2018 Status: Signed    : José Luis Weir PA-C (Physician Assistant)          Assessment and Plan      was seen today for eye problem.    Diagnoses and all orders for this visit:    Subconjunctival hemorrhage, right         HPI     Chief Complaint   Patient presents with   ? Eye Problem     redness in right eye, getting worse       Quentin Shen is a 18 y.o. male seen today for a popped blood vessel in his right eye. First showed up on Friday, 3 days ago.  Denies any associated trauma.  Appeared spontaneously.  No headache, eye pain, or change in vision.       Current Outpatient Prescriptions:   ?  ACETONE, URINE, TEST strip, CHECK URINE KETONES FOR ELEVATED BLOOD SUGARS AND OR ILLNESS, Disp: , Rfl: 11  ?  albuterol (PROAIR HFA;PROVENTIL HFA;VENTOLIN HFA) 90 mcg/actuation inhaler, Inhale 2 puffs every 4 (four) hours as needed., Disp: 1 Inhaler, Rfl: 0  ?  albuterol (PROVENTIL) 2.5 mg /3 mL (0.083 %) nebulizer solution, Take 3 mL (2.5 mg total) by nebulization every 4 (four) hours. X 4 days, then use every 4-6 hours as needed for cough or wheezing, Disp: 30 vial, Rfl: 1  ?  BD VEO INSULIN SYR HALF UNIT 0.3 mL 31 gauge x 15/64\" Syrg, USES UP TO 8 SYRINGES DAILY FOR INSULIN INJECTIONS., Disp: , Rfl: 11  ?  dextroamphetamine (DEXTROSTAT) 10 MG tablet, TK 1 T PO  QD AROUND 4-5 PM, Disp: , Rfl: 0  ?  DULoxetine (CYMBALTA) 60 MG capsule, TK ONE C PO  D., Disp: , Rfl: 2  ?  fluticasone (FLOVENT HFA) 110 mcg/actuation inhaler, Inhale 2 puffs 2 (two) times a day., Disp: 1 Inhaler, Rfl: 6  ?  GLUCAGON 1 mg injection, 1 MG INTRAMUSCULAR ONCE, Disp: , Rfl: 11  ?  LEVEMIR FLEXTOUCH 100 unit/mL (3 mL) pen, , Disp: , Rfl:   ?  NOVOLOG PENFILL 100 unit/mL Crtg, , Disp: , Rfl:   ?  ONETOUCH ULTRA TEST Strp, , Disp: , Rfl:   ? "  VYVANSE 50 mg capsule, , Disp: , Rfl:      Reviewed and updated: medical history, medications and allergies.     Review of Systems     General: Denies fever, chills, fatigue.  Eyes: Blood in right eye.  No change in vision.  No eye pain or headache.     Objective     Vitals:    09/03/18 1108   BP: 118/76   Pulse: 88   Temp: 97.7  F (36.5  C)   TempSrc: Oral   SpO2: 97%   Weight: 166 lb (75.3 kg)        Reviewed vital signs.  General: Appears calm, comfortable. Answers questions quickly and appropriately with clear speech. No apparent distress.  Skin: Pink, warm, dry.  Eyes: PERRL, EOMI. right eye exhibits a subconjunctival hemorrhage without hyphema.  The globe is nontender.  Visual acuity is grossly intact.  HENT: Normocephalic, atraumatic.  Neck: Supple.  Cardiovascular: Strong, regular radial pulse.  Respiratory: Normal respiratory effort.  Neuro: Memory and cognition appear normal. Normal gait.  Psych: Mood and affect appear normal.           Imaging:   No results found.    Labs:  No results found for this or any previous visit (from the past 24 hour(s)).     Medical Decision-Making      is a well-appearing 18-year-old male presents 3 days after onset of a spontaneous subconjunctival hemorrhage of his right eye.  Appearance is nontoxic.  He is not tachycardic, tachypnea, or febrile.  Visual acuity is grossly intact.  Denies eye pain, headache.  He is confident that there was no associated trauma.  No hyphema.  No tearing.  Globe is nontender.  He is not anticoagulated.  Discussed the expected course of a sub-conjunctival hemorrhage.  He will follow-up with ophthalmology if it is not reabsorbing over the next 2-3 weeks.    Reviewed red flags that would trigger a prompt return to the clinic as noted below under patient instructions.  He expressed understanding of these directions and is in agreement with the plan.     Patient Instructions     Patient Instructions     Subconjunctival Hemorrhage    A  subconjunctival hemorrhage is a result of a broken blood vessel in the white part of the eye. It is usually painless and may be caused by coughing, sneezing, or vomiting. An injury to the eye can cause this. It can also be a sign of high blood pressure (hypertension) or a bleeding disorder.  This can look frightening. But the presence of the blood is not serious. The blood will be reabsorbed without treatment within 2 to 3 weeks.  Home care  You may continue your usual activities.  Follow-up care  Follow up with your healthcare provider, or as advised.  When to seek medical advice  Contact your healthcare provider right away if any of these occur:    Pain in the eye    Change in vision    The blood does not go away within 3 weeks    Increasing redness or swelling of the eye    Severe headache or dizziness    Signs of bruising or bleeding from other parts of your body  Date Last Reviewed: 8/1/2017 2000-2017 [employer]. [address] [place] [address]. All rights reserved. This information is not intended as a substitute for professional medical care. Always follow your healthcare professional's instructions.            Discussed benefit vs risk of medications, dosing, side effects.  Patient was able to verbalize understanding.  After visit summary was provided for patient.     Gabino Weir PA-C

## 2021-07-08 ENCOUNTER — TRANSFERRED RECORDS (OUTPATIENT)
Dept: HEALTH INFORMATION MANAGEMENT | Facility: CLINIC | Age: 21
End: 2021-07-08

## 2021-07-08 LAB
ALBUMIN (URINE) MG/SPEC: 23 MG/L
ALBUMIN/CREATININE RATIO: 45.29 MG/G (ref 0–30)
CREATININE (URINE): 50.78 MG/DL (ref 40–278)

## 2021-07-29 ENCOUNTER — TRANSFERRED RECORDS (OUTPATIENT)
Dept: HEALTH INFORMATION MANAGEMENT | Facility: CLINIC | Age: 21
End: 2021-07-29

## 2021-08-10 ENCOUNTER — TRANSCRIBE ORDERS (OUTPATIENT)
Dept: OTHER | Age: 21
End: 2021-08-10

## 2021-08-10 DIAGNOSIS — F84.0 AUTISM: ICD-10-CM

## 2021-08-10 DIAGNOSIS — F90.9 ADHD (ATTENTION DEFICIT HYPERACTIVITY DISORDER): ICD-10-CM

## 2021-08-10 DIAGNOSIS — F41.9 ANXIETY: ICD-10-CM

## 2021-08-10 DIAGNOSIS — E10.9 DIABETES MELLITUS TYPE 1 (H): Primary | ICD-10-CM

## 2021-08-10 DIAGNOSIS — Z86.59 HISTORY OF MOOD DISORDER: ICD-10-CM

## 2021-11-18 ENCOUNTER — TRANSFERRED RECORDS (OUTPATIENT)
Dept: HEALTH INFORMATION MANAGEMENT | Facility: CLINIC | Age: 21
End: 2021-11-18
Payer: COMMERCIAL

## 2022-01-28 ENCOUNTER — LAB (OUTPATIENT)
Dept: FAMILY MEDICINE | Facility: CLINIC | Age: 22
End: 2022-01-28
Attending: FAMILY MEDICINE
Payer: COMMERCIAL

## 2022-01-28 DIAGNOSIS — Z20.822 SUSPECTED 2019 NOVEL CORONAVIRUS INFECTION: ICD-10-CM

## 2022-01-28 PROCEDURE — U0005 INFEC AGEN DETEC AMPLI PROBE: HCPCS | Mod: 90

## 2022-01-28 PROCEDURE — U0003 INFECTIOUS AGENT DETECTION BY NUCLEIC ACID (DNA OR RNA); SEVERE ACUTE RESPIRATORY SYNDROME CORONAVIRUS 2 (SARS-COV-2) (CORONAVIRUS DISEASE [COVID-19]), AMPLIFIED PROBE TECHNIQUE, MAKING USE OF HIGH THROUGHPUT TECHNOLOGIES AS DESCRIBED BY CMS-2020-01-R: HCPCS | Mod: 90

## 2022-01-28 PROCEDURE — 99000 SPECIMEN HANDLING OFFICE-LAB: CPT

## 2022-01-29 LAB — SARS-COV-2 RNA RESP QL NAA+PROBE: NEGATIVE

## 2022-02-05 ENCOUNTER — HEALTH MAINTENANCE LETTER (OUTPATIENT)
Age: 22
End: 2022-02-05

## 2022-03-07 ENCOUNTER — TELEPHONE (OUTPATIENT)
Dept: NEUROPSYCHOLOGY | Facility: CLINIC | Age: 22
End: 2022-03-07
Payer: COMMERCIAL

## 2022-03-07 NOTE — TELEPHONE ENCOUNTER
----- Message from Ranulfo Wise, PhD sent at 3/4/2022 11:28 AM CST -----  Hi  I don't know. We need both an AM and a PM slot on the same day.  I'm apparently booking into June with COVID cases, so those aren't a lot of good options.    Monday and Tuesday all day are my typical spots. If I happen to have a Wednesday AM COVID slot open and also have a Wednesday PM EDMUND slot open the same day, you could put him in there.    Otherwise, you could put him on a cancellation list if 2 slots open up on the same day.    Thanks  Terrell  ----- Message -----  From: Vic Duncan  Sent: 3/4/2022  11:25 AM CST  To: Ranulfo Wise, PhD    Neo Guzman,    This pt was originally scheduled 02/22/2022 for a full day slot but had to be rescheduled. Pt grandmother Mirta is wondering if pt can be seen sooner that 07/18/2022?     Thank you,  Vic

## 2022-05-06 ENCOUNTER — TRANSFERRED RECORDS (OUTPATIENT)
Dept: HEALTH INFORMATION MANAGEMENT | Facility: CLINIC | Age: 22
End: 2022-05-06
Payer: COMMERCIAL

## 2022-08-12 ENCOUNTER — TRANSFERRED RECORDS (OUTPATIENT)
Dept: HEALTH INFORMATION MANAGEMENT | Facility: CLINIC | Age: 22
End: 2022-08-12

## 2022-08-12 LAB
CHOLESTEROL (EXTERNAL): 171 MG/DL
HBA1C MFR BLD: 8.9 % (ref 4.2–6.3)
HDLC SERPL-MCNC: 49 MG/DL
LDL CHOLESTEROL (EXTERNAL): 122 MG/DL
TRIGLYCERIDES (EXTERNAL): 58 MG/DL
TSH SERPL-ACNC: 0.8 UIU/ML (ref 0.3–4.2)

## 2022-09-25 ENCOUNTER — HEALTH MAINTENANCE LETTER (OUTPATIENT)
Age: 22
End: 2022-09-25

## 2022-11-18 NOTE — PROGRESS NOTES
The patient seen for full day appointment for this neuropsychological evaluation. Please refer to the report from today at 8:00 for full report.     Ranulfo Wise, PhD, ABPP

## 2022-11-18 NOTE — PROGRESS NOTES
RE: Quentin Shen  MR#: [id number]  : 2000  DOS: 2022      NEUROPSYCHOLOGICAL CONSULTATION      REASON FOR REFERRAL:  Quentin Shen is a 22 year old male with 12 years of education. The patient was referred for a neuropsychological evaluation by Dr. Amandeep Boykin from [facility] to assess his cognitive and emotional functioning secondary to memory and learning concerns. The evaluation was requested in order to document his current functioning, assist with the differential diagnosis, and provide appropriate recommendations. The patient was informed that the evaluation included multiple measures of performance and symptom validity, assist with the differential diagnosis, and provide appropriate recommendations. The patient was informed that the evaluation included multiple measures of performance and symptom validity, and he was encouraged to provide his best effort at all times.  The nature of the neuropsychological evaluation was also discussed, including limits of confidentiality (for suspected child or elder abuse, potential homicide or suicide, and court orders). The patient was also informed that the report would be placed on the electronic medical records system.  The patient was also given an opportunity to ask questions. The patient indicated that he understood the information and consented to participate in the evaluation.    PROCEDURES:   Review of records and clinical interview,  Mental Status-orientation, Wechsler Adult Intelligence Scale-IV, Clock Drawing Test, Verbal Fluency Test, Hung Depression Inventory, Hung Anxiety Inventory, Personality Assessment Inventory, Xiang Complex Figure Test, Trailmaking Test, NAB Naming Test, TOMM, Judgment of Line Orientation Test, Stroop Test, Wechsler Memory Scale-IV (selected subtests), Dot Counting Test, [place] Card Sorting Test, Wechsler Individual Achievement Test-III and [place] Verbal Learning Test-3    REVIEW OF RECORDS: Records  "from 8/10/21 noted a significant medical history for mood disorder, anxiety, autism, and attention deficit-hyperactivity disorder (ADHD). Medical records from 11/18/2021 noted that he also had a significant premorbid history for albuminuria, and he is followed by psychiatry for ADHD, adjustment reaction, unspecified mood disorder, and pervasive developmental disorder.  The report noted he had \"moderate control\" of his type 1 diabetes.  Records indicated the patient is being treated with Flovent, insulin, Cymbalta, Vyvanse, albuterol, dextroamphetamine, glucagon, and NovoLog.  No neuroimaging scan of the brain reports were found in the records. Records from 8/4/2021 also noted diagnoses of \"higher functioning\" autism as well as ADHD, anxiety, mood disorder and type 1 diabetes.  Report noted he required an updated evaluation for diagnostic clarification and appropriate community/[facility] supports and services. Records from 8/12/22 noted that he was followed for type 1 diabetes. These records noted he was invovled in special education for communication and math, and was otherwise in general classes in his individual education plan (IEP). 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 These records noted a history of pervasive developmental disorder, mood disorder, proteinuria, ADHD, adjustment reaction, anxiety, asthma, and appendicitis.     CLINICAL INTERVIEW: The patient was interviewed via video platform to the [facility] ([facility] [place]) and he was interviewed with his grandmother, Mirta.  On interview, the patient and his grandmother noted that his referring provider requested the neuropsychological evaluation as he is transitioning from child to adult health care providers.  They noted he had a previous neuropsychological evaluation at approximately age 9, but no records of this evaluation were found in the electronic medical records.  His grandmother said " "that currently he feels he does not have difficulties, which the patient confirmed.  She noted that he is able to drive and during the summer when he was not in school he worked two full-time jobs.  She noted sometimes he has difficulty with attention, and he is in school to become an .  The grandmother also noted that he was previously diagnosed with \"slight autism\" but she was surprised to see diagnoses of ADHD and depression in his medical records.  She reported that he has episodes where he feels sad, which the patient confirmed.  She noted that the length of these episodes depends upon what is happening to him, and will last from a couple days to a week.  They noted the patient has been on medication for ADHD since age 9, and is currently on Vyvanse.  The patient reported that his attention is doing well, although he is slow at times to complete schoolwork and procrastinates.  He also acknowledged becoming distracted at times, although in spite of this he is doing well in his training program.  His grandmother indicated she was uncertain how much his medications contributed to the cycling of his mood.  She reported that the medications typically wear off about 4:00 PM and between 4 and 7:00 PM is his \"ochoa time\".  However, she said that since he is now living in a more stable environment this is not as much an issue.  She also noted that his provider prescribed a medication that he takes about 4:00 PM because of these mood difficulties.    The patient reported that his mood is currently \"okay\" and denied significant anxiety.  His grandmother noted that he tends to be more apprehensive in the morning, which the patient confirmed.  She elaborated that his medications \"play a big role in his day.\"  The patient denied difficulty with sleep.  However his grandmother said that when he gets home from school he will take a 1-2-hour nap often, which disrupts his sleep cycle.  He reported that his appetite " "was \"good\" and his grandmother noted he has a 40-50 pound weight gain during the course of the pandemic.  The patient acknowledges and indicated that he had stopped exercising and also his diet was poor.  The patient noted that he has been working with psychiatric providers since age 9.  He denied any suicidal or homicidal ideation, denied any history of suicide attempts.  He also denied any hallucinations or delusions.  The patient denied any use of alcohol, tobacco, or illicit substances.  In particular, he said he does not drink alcohol because of his diabetes.    In terms of medical issues, they confirmed his history of type 1 diabetes and hypertension.  They also reported that he recently began taking lisinopril for his high blood pressure.  His grandmother indicated that is blood glucose levels are not well managed and his last hemoglobin A1c was 9.1.  She indicated that they are interested in having him see adult endocrinology and a dietitian for these issues.  The patient denied any history of traumatic brain injury with loss of consciousness.  However, his grandmother noted that he had 3 instances where he fell and had \"goose eggs\" on this head but he was never seen by a medical provider.  The patient said he did not lose consciousness in any of these incidents.  They also noted that he was diagnosed with COVID-19 in May, 2022 but he was not hospitalized.  The patient denied any history of multiple sclerosis, stroke, seizures, Parkinson's disease, sleep apnea, hypothyroidism, heart disease, cancer, or liver disease.  They noted he has had some kidney issues that are likely related to his diabetes, but he is not receiving active treatment for this.  He also noted he recently got eyeglasses for reading, but he denied any hearing problems.  The patient also noted a family history of thyroid problems in his mother.    Academically, they confirmed the patient had an individual education plan (IEP) for math and " communication in school, and that math continues to be a weakness for him.  However, they indicated that he is doing significantly better socially in his vocational training program than he did in high school.  The patient confirmed this and said he did not like high school, but he enjoys college a great deal.  He denied ever having to repeat a grade.  He reported that he is in the second year of a 2-year training program to become an , and then will do a separate one year training program to become an .  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  He reported that he is single, has never been , and has no children.  He noted that he currently lives with his grandmother.    BEHAVIORAL OBSERVATIONS:  On examination, the patient was casually but appropriately dressed and groomed. The patient was cooperative with the evaluation and was talkative.  Speech was normal in volume, rate and tone.  The patient also appeared to put forth their best effort on all tasks. Thus, the results of this evaluation are a reasonably valid reflection of the patient's current level of functioning. There were no indications of hallucinations, delusions or unusual thought processes and the patient was generally well oriented to personal information, place, and time. There were no demonstrations of a practical memory problem. The patient was a good historian, with a self-report consistent with medical records. Affect was also generally appropriate.      RESULTS: Formal performance validity measures were within expected limits and consistent with the above noted observations.  Measures of the patient's verbally mediated intellectual functioning were in the low average range, while visually mediated and functional functioning was in the average range.  Vocabulary and fund of information were at the lower end of the average range.  Thus, general  intellectual functioning was in the low average range.  Academic achievement in reading and written language was generally within expected limits.  Single word reading ability was in the low average range, while reading comprehension was in the average range.  Written spelling was also in the average range.  However, academic achievement in math was variable.  Specifically, academic achievement in mathematical problem-solving was in the low average range, however academic achievement in mathematical operations was poorer and in the exceptionally low range.  Thus, there was evidence for a specific learning disorder in math, which is consistent with the reported history.    Measures of attention/concentration were variable.  For example, mental calculation was in the below average range.  Further, a digit span task was in the low average range.  A computer-based measure of complex and sustained attention was generally within expected limits and significantly better, however.  Speed of information processing was also slower than expected.  For example, psychomotor speed was in the low average range.  A self-report measure of current and childhood memories of attentional difficulties was completed in a valid and interpretable fashion.  On this measure, the patient denied any attentional problems either currently or in childhood.    Measures of the patient's memory functioning were broadly within expected limits.  For example, immediate and delayed verbal recall on a story memory task were in the low average range.  Verbal recognition memory on this task was poorer than expected.  On a word list learning task, initially his performance was below expected limits, however he demonstrated an appropriate learning curve.  Delayed free and cued recall ranged from low average to average.  Verbal recognition memory on this task was in the average range.  Visual memory was poor.  Immediate recall of a complex figure drawing was  in the exceptionally low range, but delayed recall was better and in the below average range.  Visual recognition memory was also within expected limits.  Thus, there was no evidence for rapid forgetting of previously learned verbal or visual information.    Expressive language functioning was variable, but this is likely secondary to the varialibty in processing speed rather than language processing deficits.  For example, verbal abstract reasoning was in the average range.  Further, untimed confrontation naming was in the average range.  However, timed verbal fluency tasks, including semantic and phonemic fluency, were in the low average range, likely related to variable processing speed rather than language functioning difficulties.  Receptive language functioning, as assessed by complex ideational material task, was in the average range.    Visual-spatial and visual constructional abilities were generally within expected limits.  For example, motor-free line orientation judgment was within expected limits.  Further, motor-free visual reasoning was in the average range.  Likewise, a block construction task, a measure of visual-motor integration, was in the average range.  There is also no evidence for significant visual-spatial distortions on clock drawing or complex figure drawing tasks.    Measures of the patient's executive functioning were generally within expected limits as well, except for organizational difficulties on a complex figure drawing task.  A measure of cognitive flexibility and set shifting ability was generally in the average to low average range, and indicated evidence that the patient could adequately utilize feedback in order to alter and improve his performance.  Likewise, a measure of response inhibition that integrates processing speed was in the average range.  A complex visual scanning test that integrates cognitive flexibility was in the below average range, but consistent with a  simpler version that integrated only attention. For example, verbal and visual reasoning abilities were in the average range.    Formal personality evaluation was completed utilizing the clinical interview, an objective measure of emotional functioning, and self-report measures of depression and anxiety.  On the objective measure of emotional functioning, the patient responded similarly to individuals who tend to portray themselves as being exceptionally free of common shortcomings to which most individuals will admit, and these individuals are often quite reluctant to admit to minor faults.  Given this high level of defensiveness, this measure could not be interpreted.  He did not endorse any emotional distress on this measure.    SUMMARY:  In summary, the neuropsychological assessment results indicated no evidence for significant change or decline in global cognitive functioning.  Results indicated that verbally mediated intellectual functioning was in the low average range, while visually mediated intellectual functioning was slightly higher and in the average range.  The patient did not endorse current or childhood symptoms of ADHD, and while there was mild variability in attention, organizational difficulties, and slowed processing speed, the results did not indicate convincing evidence for current or historical symptoms that meet criteria for ADHD.  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  In contrast, academic achievement in reading and written spelling was consistent with his general intellectual functioning.  He also did not endorse any significant emotional distress, and generally had a tendency to respond in any defensive manner on an objective measure of emotional functioning.  Additionally, while a formal autism evaluation was outside the scope of this evaluation, the records and his grandmother both noted a history of a diagnosis of high functioning autism  spectrum disorder (ASD).    RECOMMENDATIONS:   1. Continued psychiatric consultation to address medication management is recommended.  If it is decided to transition the patient to an adult focused psychiatric provider, the [facility] Department of Psychiatry is one option for this service at [email or web address] or [phone].  2. A referral for psychotherapeutic intervention is also recommended. A highly structured cognitive-behavioral intervention focused on coping and stress management would likely be the most beneficial.  Given his difficulties managing diabetes, working with a health psychologist may be particularly beneficial, given their expertise in assisting individuals with chronic health conditions.  One option for this service is through the [facility] at [email or web address] or [phone].  Student:  3. Given the reported significant difficulties in managing his medical history of type 1 diabetes, a referral for endocrinology is recommended.  4. Related, a referral to a dietitian may be beneficial in assisting him in nutritional management of his type 1 diabetes.  5. The patient is also encouraged to consult with his health care providers to engage in regular exercise as medically indicated.  6. Given the reported sleep difficulties, it is recommended that sleep hygiene be addressed to improve the quality and duration of sleep. The following are recommendations from the National Sleep Foundation that may be helpful:     Avoid napping during the day; it can disturb the normal pattern of sleep and wakefulness.    Avoid stimulants such as caffeine, nicotine, and alcohol too close to bedtime. While alcohol is well known to speed the onset of sleep, it disrupts sleep in the second half as the body begins to metabolize the alcohol, causing arousal.    Exercise can  promote good sleep. Vigorous exercise should be taken in the morning or late afternoon. A relaxing exercise, like yoga, can be done before bed to help initiate a restful night's sleep.    Food can be disruptive right before sleep; stay away from large meals close to bedtime. Also dietary changes can cause sleep problems, if someone is struggling with a sleep problem, it's not a good time to start experimenting with spicy dishes. And, remember, chocolate has caffeine.    Ensure adequate exposure to natural light. This is particularly important for older people who may not venture outside as frequently as children and adults. Light exposure helps maintain a healthy sleep-wake cycle.    Establish a regular relaxing bedtime routine. Try to avoid emotionally upsetting conversations and activities before trying to go to sleep. Don't dwell on, or bring your problems to bed.    Associate your bed with sleep. It's not a good idea to use your bed to watch TV, listen to the radio, or read.    Make sure that the sleep environment is pleasant and relaxing. The bed should be comfortable, the room should not be too hot or cold, or too bright.  7. Given his variable attention and processing speed, the patient may find the following attention and organizational strategies helpful:     Use cell phone reminders to help monitor upcoming appointments and due dates as well as other important tasks (e.g., when to take medications). Set the reminder to go off several times prior to the event with advanced notice (e.g., one week before, two days before, the day before, and the morning of the event).     He should attempt to reduce distractions at home. Having a clutter-free work environment and removing or at least making it harder to access potential distractions would help optimize his attention. He might also consider facing away from high traffic areas and using earplugs or noise cancellation headphones when desiring a quiet work  environment.    Taking short breaks during his day may help optimize and maintain his concentration.     It is common for individuals like the patient who struggle with inattention to develop procrastination habits. He may find it useful to evaluate whether he is procrastinating on a task because he considers it to be overwhelming. If so, he may find it useful to break the task down into more manageable components or steps.    Make to-do lists and prioritize tasks. Break down large tasks into smaller steps and check off each small step when completed.   8. Given the variable attention and slowed processing speed, it is recommended that the patient optimize his environment to aid his attention and focus as well as compensate for variable processing speed. This includes:    Sit in the front of the room during lectures to minimize distractions.    Label, highlight, underline and add color to directions on assignments.     Have class notes printed and reviewed in advance.    If notes are not available, having the professor provide them in advance.     Record class lectures so they can be reviewed at a later time and at a pace that is comfortable for the patient.     The patient is encouraged to seek out a  for additional assistance with study skills and organizational abilities.   9. In order to promote learning and memorization of new verbal material, it is recommended that the patient add structure to the material he is learning to promote subsequent recall (e.g., use mnemonic devices, acronyms, make up a story or song). Additionally, he is encouraged to use visual aids, such as flash cards, diagrams, charts, etc.   10.  Given the evidence for a specific learning disorder in mathematics, a referral for tutoring services is recommended for any math focused classes he has to take.  In addition, allowing additional time to complete tests and assignments in math oriented classes is recommended.  11.  If a formal  updated diagnosis for autism spectrum disorder (ASD) is required, referral to specialist in adult ASD is recommended as this differential diagnosis is outside the scope of this neuropsychological evaluation.    12. The results of the evaluation indicate that he generally has adequate capacity for informed personal and medical decision making. However, family assistance with complex decision making is recommended.    13. The results of the evaluation now constitute a baseline of the patient s cognitive functioning.  If cognitive difficulties become a concern in the future, a referral for a neuropsychological reevaluation at that time might be considered.    I attempted to contact the patient via telephone on 11/22/2022 to discuss the results of the evaluation.  Unfortunately, the phone went to voicemail each time, so I sent the patient a message through ooma.  I encouraged him to follow-up with his referring healthcare providers to facilitate the recommendations noted in this report.  Thank you for referring this interesting individual. If you have any questions, please feel free to contact me.      Ranulfo Wise, PhD, ABPP, LP  Professor and Licensed Psychologist [id number]  Board Certified Clinical Neuropsychologist    Time Spent:      Minutes Date Code Units   Total Time-Neuropsychologist Professional 293 11/21/22 60249 1     11/22/22 25153 4   Neuropsychologist Admin/scoring 0 11/21/22 [id number] 0     11/21/22 [id number] 0   Diagnostic Interview  30 11/21/22 67352 1   Psychometrician Time-Test Administration/Score 469 11/21/22 18208 1     11/22/22 39449 12   Diagnosis R41.840 F81.2 F84.0

## 2022-11-21 ENCOUNTER — OFFICE VISIT (OUTPATIENT)
Dept: NEUROPSYCHOLOGY | Facility: CLINIC | Age: 22
End: 2022-11-21
Payer: COMMERCIAL

## 2022-11-21 DIAGNOSIS — F84.0 AUTISM: ICD-10-CM

## 2022-11-21 DIAGNOSIS — R41.840 ATTENTION DEFICIT: Primary | ICD-10-CM

## 2022-11-21 DIAGNOSIS — Z03.89 NO DIAGNOSIS ON AXIS I: Primary | ICD-10-CM

## 2022-11-21 DIAGNOSIS — F81.2 MATHEMATICS DISORDER: ICD-10-CM

## 2022-11-21 PROCEDURE — 96139 PSYCL/NRPSYC TST TECH EA: CPT | Performed by: PSYCHOLOGIST

## 2022-11-21 PROCEDURE — 90791 PSYCH DIAGNOSTIC EVALUATION: CPT | Performed by: PSYCHOLOGIST

## 2022-11-21 PROCEDURE — 96132 NRPSYC TST EVAL PHYS/QHP 1ST: CPT | Performed by: PSYCHOLOGIST

## 2022-11-21 PROCEDURE — 99207 PR NO CHARGE LOS: CPT | Performed by: PSYCHOLOGIST

## 2022-11-21 PROCEDURE — 96138 PSYCL/NRPSYC TECH 1ST: CPT | Performed by: PSYCHOLOGIST

## 2022-11-21 PROCEDURE — 96133 NRPSYC TST EVAL PHYS/QHP EA: CPT | Performed by: PSYCHOLOGIST

## 2022-11-21 NOTE — LETTER
2022      RE: Quentin Shen  [address]  [place] [address]   MR#: [id number]  : 2000  DOS: 2022      NEUROPSYCHOLOGICAL CONSULTATION      REASON FOR REFERRAL:  Quentin Shen is a 22 year old male with 12 years of education. The patient was referred for a neuropsychological evaluation by Dr. Amandeep Boykin from [facility] to assess his cognitive and emotional functioning secondary to memory and learning concerns. The evaluation was requested in order to document his current functioning, assist with the differential diagnosis, and provide appropriate recommendations. The patient was informed that the evaluation included multiple measures of performance and symptom validity, assist with the differential diagnosis, and provide appropriate recommendations. The patient was informed that the evaluation included multiple measures of performance and symptom validity, and he was encouraged to provide his best effort at all times.  The nature of the neuropsychological evaluation was also discussed, including limits of confidentiality (for suspected child or elder abuse, potential homicide or suicide, and court orders). The patient was also informed that the report would be placed on the electronic medical records system.  The patient was also given an opportunity to ask questions. The patient indicated that he understood the information and consented to participate in the evaluation.    PROCEDURES:   Review of records and clinical interview,  Mental Status-orientation, Wechsler Adult Intelligence Scale-IV, Clock Drawing Test, Verbal Fluency Test, Hung Depression Inventory, Hung Anxiety Inventory, Personality Assessment Inventory, Xiang Complex Figure Test, Trailmaking Test, NAB Naming Test, TOMM, Judgment of Line Orientation Test, Stroop Test, Wechsler Memory Scale-IV (selected subtests), Dot Counting Test, Wisconsin Card Sorting Test, Wechsler Individual Achievement Test-III and  "California Verbal Learning Test-3    REVIEW OF RECORDS: Records from 8/10/21 noted a significant medical history for mood disorder, anxiety, autism, and attention deficit-hyperactivity disorder (ADHD). Medical records from 11/18/2021 noted that he also had a significant premorbid history for albuminuria, and he is followed by psychiatry for ADHD, adjustment reaction, unspecified mood disorder, and pervasive developmental disorder.  The report noted he had \"moderate control\" of his type 1 diabetes.  Records indicated the patient is being treated with Flovent, insulin, Cymbalta, Vyvanse, albuterol, dextroamphetamine, glucagon, and NovoLog.  No neuroimaging scan of the brain reports were found in the records. Records from 8/4/2021 also noted diagnoses of \"higher functioning\" autism as well as ADHD, anxiety, mood disorder and type 1 diabetes.  Report noted he required an updated evaluation for diagnostic clarification and appropriate community/[facility] supports and services. Records from 8/12/22 noted that he was followed for type 1 diabetes. These records noted he was invovled in special education for communication and math, and was otherwise in general classes in his individual education plan (IEP). 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 These records noted a history of pervasive developmental disorder, mood disorder, proteinuria, ADHD, adjustment reaction, anxiety, asthma, and appendicitis.     CLINICAL INTERVIEW: The patient was [facility] via video platform [facility] and he was interviewed with his grandmother, Mirta.  On interview, the patient and his grandmother noted that his referring provider requested the neuropsychological evaluation as he is transitioning from child to adult health care providers.  They noted he had a previous neuropsychological evaluation at approximately age 9, but no records of this evaluation were " "found in the electronic medical records.  His grandmother said that currently he feels he does not have difficulties, which the patient confirmed.  She noted that he is able to drive and during the summer when he was not in school he worked two full-time jobs.  She noted sometimes he has difficulty with attention, and he is in school to become an .  The grandmother also noted that he was previously diagnosed with \"slight autism\" but she was surprised to see diagnoses of ADHD and depression in his medical records.  She reported that he has episodes where he feels sad, which the patient confirmed.  She noted that the length of these episodes depends upon what is happening to him, and will last from a couple days to a week.  They noted the patient has been on medication for ADHD since age 9, and is currently on Vyvanse.  The patient reported that his attention is doing well, although he is slow at times to complete schoolwork and procrastinates.  He also acknowledged becoming distracted at times, although in spite of this he is doing well in his training program.  His grandmother indicated she was uncertain how much his medications contributed to the cycling of his mood.  She reported that the medications typically wear off about 4:00 PM and between 4 and 7:00 PM is his \"ochoa time\".  However, she said that since he is now living in a more stable environment this is not as much an issue.  She also noted that his provider prescribed a medication that he takes about 4:00 PM because of these mood difficulties.    The patient reported that his mood is currently \"okay\" and denied significant anxiety.  His grandmother noted that he tends to be more apprehensive in the morning, which the patient confirmed.  She elaborated that his medications \"play a big role in his day.\"  The patient denied difficulty with sleep.  However his grandmother said that when he gets home from school he will take a 1-2-hour nap often, " "which disrupts his sleep cycle.  He reported that his appetite was \"good\" and his grandmother noted he has a 40-50 pound weight gain during the course of the pandemic.  The patient acknowledges and indicated that he had stopped exercising and also his diet was poor.  The patient noted that he has been working with psychiatric providers since age 9.  He denied any suicidal or homicidal ideation, denied any history of suicide attempts.  He also denied any hallucinations or delusions.  The patient denied any use of alcohol, tobacco, or illicit substances.  In particular, he said he does not drink alcohol because of his diabetes.    In terms of medical issues, they confirmed his history of type 1 diabetes and hypertension.  They also reported that he recently began taking lisinopril for his high blood pressure.  His grandmother indicated that is blood glucose levels are not well managed and his last hemoglobin A1c was 9.1.  She indicated that they are interested in having him see adult endocrinology and a dietitian for these issues.  The patient denied any history of traumatic brain injury with loss of consciousness.  However, his grandmother noted that he had 3 instances where he fell and had \"goose eggs\" on this head but he was never seen by a medical provider.  The patient said he did not lose consciousness in any of these incidents.  They also noted that he was diagnosed with COVID-19 in May, 2022 but he was not hospitalized.  The patient denied any history of multiple sclerosis, stroke, seizures, Parkinson's disease, sleep apnea, hypothyroidism, heart disease, cancer, or liver disease.  They noted he has had some kidney issues that are likely related to his diabetes, but he is not receiving active treatment for this.  He also noted he recently got eyeglasses for reading, but he denied any hearing problems.  The patient also noted a family history of thyroid problems in his mother.    Academically, they confirmed " the patient had an individual education plan (IEP) for math and communication in school, and that math continues to be a weakness for him.  However, they indicated that he is doing significantly better socially in his vocational training program than he did in high school.  The patient confirmed this and said he did not like high school, but he enjoys college a great deal.  He denied ever having to repeat a grade.  He reported that he is in the second year of a 2-year training program to become an , and then will do a separate one year training program to become an .  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  He reported that he is single, has never been , and has no children.  He noted that he currently lives with his grandmother.    BEHAVIORAL OBSERVATIONS:  On examination, the patient was casually but appropriately dressed and groomed. The patient was cooperative with the evaluation and was talkative.  Speech was normal in volume, rate and tone.  The patient also appeared to put forth their best effort on all tasks. Thus, the results of this evaluation are a reasonably valid reflection of the patient's current level of functioning. There were no indications of hallucinations, delusions or unusual thought processes and the patient was generally well oriented to personal information, place, and time. There were no demonstrations of a practical memory problem. The patient was a good historian, with a self-report consistent with medical records. Affect was also generally appropriate.      RESULTS: Formal performance validity measures were within expected limits and consistent with the above noted observations.  Measures of the patient's verbally mediated intellectual functioning were in the low average range, while visually mediated and functional functioning was in the average range.  Vocabulary and fund of information were  at the lower end of the average range.  Thus, general intellectual functioning was in the low average range.  Academic achievement in reading and written language was generally within expected limits.  Single word reading ability was in the low average range, while reading comprehension was in the average range.  Written spelling was also in the average range.  However, academic achievement in math was variable.  Specifically, academic achievement in mathematical problem-solving was in the low average range, however academic achievement in mathematical operations was poorer and in the exceptionally low range.  Thus, there was evidence for a specific learning disorder in math, which is consistent with the reported history.    Measures of attention/concentration were variable.  For example, mental calculation was in the below average range.  Further, a digit span task was in the low average range.  A computer-based measure of complex and sustained attention was generally within expected limits and significantly better, however.  Speed of information processing was also slower than expected.  For example, psychomotor speed was in the low average range.  A self-report measure of current and childhood memories of attentional difficulties was completed in a valid and interpretable fashion.  On this measure, the patient denied any attentional problems either currently or in childhood.    Measures of the patient's memory functioning were broadly within expected limits.  For example, immediate and delayed verbal recall on a story memory task were in the low average range.  Verbal recognition memory on this task was poorer than expected.  On a word list learning task, initially his performance was below expected limits, however he demonstrated an appropriate learning curve.  Delayed free and cued recall ranged from low average to average.  Verbal recognition memory on this task was in the average range.  Visual memory was  poor.  Immediate recall of a complex figure drawing was in the exceptionally low range, but delayed recall was better and in the below average range.  Visual recognition memory was also within expected limits.  Thus, there was no evidence for rapid forgetting of previously learned verbal or visual information.    Expressive language functioning was variable, but this is likely secondary to the varialibty in processing speed rather than language processing deficits.  For example, verbal abstract reasoning was in the average range.  Further, untimed confrontation naming was in the average range.  However, timed verbal fluency tasks, including semantic and phonemic fluency, were in the low average range, likely related to variable processing speed rather than language functioning difficulties.  Receptive language functioning, as assessed by complex ideational material task, was in the average range.    Visual-spatial and visual constructional abilities were generally within expected limits.  For example, motor-free line orientation judgment was within expected limits.  Further, motor-free visual reasoning was in the average range.  Likewise, a block construction task, a measure of visual-motor integration, was in the average range.  There is also no evidence for significant visual-spatial distortions on clock drawing or complex figure drawing tasks.    Measures of the patient's executive functioning were generally within expected limits as well, except for organizational difficulties on a complex figure drawing task.  A measure of cognitive flexibility and set shifting ability was generally in the average to low average range, and indicated evidence that the patient could adequately utilize feedback in order to alter and improve his performance.  Likewise, a measure of response inhibition that integrates processing speed was in the average range.  A complex visual scanning test that integrates cognitive flexibility was  in the below average range, but consistent with a simpler version that integrated only attention. For example, verbal and visual reasoning abilities were in the average range.    Formal personality evaluation was completed utilizing the clinical interview, an objective measure of emotional functioning, and self-report measures of depression and anxiety.  On the objective measure of emotional functioning, the patient responded similarly to individuals who tend to portray themselves as being exceptionally free of common shortcomings to which most individuals will admit, and these individuals are often quite reluctant to admit to minor faults.  Given this high level of defensiveness, this measure could not be interpreted.  He did not endorse any emotional distress on this measure.    SUMMARY:  In summary, the neuropsychological assessment results indicated no evidence for significant change or decline in global cognitive functioning.  Results indicated that verbally mediated intellectual functioning was in the low average range, while visually mediated intellectual functioning was slightly higher and in the average range.  The patient did not endorse current or childhood symptoms of ADHD, and while there was mild variability in attention, organizational difficulties, and slowed processing speed, the results did not indicate convincing evidence for current or historical symptoms that meet criteria for ADHD.  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  In contrast, academic achievement in reading and written spelling was consistent with his general intellectual functioning.  He also did not endorse any significant emotional distress, and generally had a tendency to respond in any defensive manner on an objective measure of emotional functioning.  Additionally, while a formal autism evaluation was outside the scope of this evaluation, the records and his grandmother both noted a  history of a diagnosis of high functioning autism spectrum disorder (ASD).    RECOMMENDATIONS:   1. Continued psychiatric consultation to address medication management is recommended.  If it is decided to transition the patient to an adult focused psychiatric provider, the [facility] Department of Psychiatry is one option for this service at [email or web address] or [phone].  2. A referral for psychotherapeutic intervention is also recommended. A highly structured cognitive-behavioral intervention focused on coping and stress management would likely be the most beneficial.  Given his difficulties managing diabetes, working with a health psychologist may be particularly beneficial, given their expertise in assisting individuals with chronic health conditions.  One option for this service is through the [facility] at [email or web address] or [phone].  Student:  3. Given the reported significant difficulties in managing his medical history of type 1 diabetes, a referral for endocrinology is recommended.  4. Related, a referral to a dietitian may be beneficial in assisting him in nutritional management of his type 1 diabetes.  5. The patient is also encouraged to consult with his health care providers to engage in regular exercise as medically indicated.  6. Given the reported sleep difficulties, it is recommended that sleep hygiene be addressed to improve the quality and duration of sleep. The following are recommendations from the National Sleep Foundation that may be helpful:     Avoid napping during the day; it can disturb the normal pattern of sleep and wakefulness.    Avoid stimulants such as caffeine, nicotine, and alcohol too close to bedtime. While alcohol is well known to speed the onset of sleep, it disrupts sleep in the second half as the body begins to metabolize  the alcohol, causing arousal.    Exercise can promote good sleep. Vigorous exercise should be taken in the morning or late afternoon. A relaxing exercise, like yoga, can be done before bed to help initiate a restful night's sleep.    Food can be disruptive right before sleep; stay away from large meals close to bedtime. Also dietary changes can cause sleep problems, if someone is struggling with a sleep problem, it's not a good time to start experimenting with spicy dishes. And, remember, chocolate has caffeine.    Ensure adequate exposure to natural light. This is particularly important for older people who may not venture outside as frequently as children and adults. Light exposure helps maintain a healthy sleep-wake cycle.    Establish a regular relaxing bedtime routine. Try to avoid emotionally upsetting conversations and activities before trying to go to sleep. Don't dwell on, or bring your problems to bed.    Associate your bed with sleep. It's not a good idea to use your bed to watch TV, listen to the radio, or read.    Make sure that the sleep environment is pleasant and relaxing. The bed should be comfortable, the room should not be too hot or cold, or too bright.  7. Given his variable attention and processing speed, the patient may find the following attention and organizational strategies helpful:     Use cell phone reminders to help monitor upcoming appointments and due dates as well as other important tasks (e.g., when to take medications). Set the reminder to go off several times prior to the event with advanced notice (e.g., one week before, two days before, the day before, and the morning of the event).     He should attempt to reduce distractions at home. Having a clutter-free work environment and removing or at least making it harder to access potential distractions would help optimize his attention. He might also consider facing away from high traffic areas and using earplugs or noise  cancellation headphones when desiring a quiet work environment.    Taking short breaks during his day may help optimize and maintain his concentration.     It is common for individuals like the patient who struggle with inattention to develop procrastination habits. He may find it useful to evaluate whether he is procrastinating on a task because he considers it to be overwhelming. If so, he may find it useful to break the task down into more manageable components or steps.    Make to-do lists and prioritize tasks. Break down large tasks into smaller steps and check off each small step when completed.   8. Given the variable attention and slowed processing speed, it is recommended that the patient optimize his environment to aid his attention and focus as well as compensate for variable processing speed. This includes:    Sit in the front of the room during lectures to minimize distractions.    Label, highlight, underline and add color to directions on assignments.     Have class notes printed and reviewed in advance.    If notes are not available, having the professor provide them in advance.     Record class lectures so they can be reviewed at a later time and at a pace that is comfortable for the patient.     The patient is encouraged to seek out a  for additional assistance with study skills and organizational abilities.   9. In order to promote learning and memorization of new verbal material, it is recommended that the patient add structure to the material he is learning to promote subsequent recall (e.g., use mnemonic devices, acronyms, make up a story or song). Additionally, he is encouraged to use visual aids, such as flash cards, diagrams, charts, etc.   10.  Given the evidence for a specific learning disorder in mathematics, a referral for tutoring services is recommended for any math focused classes he has to take.  In addition, allowing additional time to complete tests and assignments in math  oriented classes is recommended.  11.  If a formal updated diagnosis for autism spectrum disorder (ASD) is required, referral to specialist in adult ASD is recommended as this differential diagnosis is outside the scope of this neuropsychological evaluation.    12. The results of the evaluation indicate that he generally has adequate capacity for informed personal and medical decision making. However, family assistance with complex decision making is recommended.    13. The results of the evaluation now constitute a baseline of the patient s cognitive functioning.  If cognitive difficulties become a concern in the future, a referral for a neuropsychological reevaluation at that time might be considered.    I attempted to contact the patient via telephone on 11/22/2022 to discuss the results of the evaluation.  Unfortunately, the phone went to voicemail each time, so I sent the patient a message through [employer].  I encouraged him to follow-up with his referring healthcare providers to facilitate the recommendations noted in this report.  Thank you for referring this interesting individual. If you have any questions, please feel free to contact me.      Ranulfo Wise, PhD, ABPP, LP  Professor and Licensed Psychologist [id number]  Board Certified Clinical Neuropsychologist    Time Spent:      Minutes Date Code Units   Total Time-Neuropsychologist Professional 293 11/21/22 95321 1     11/22/22 66315 4   Neuropsychologist Admin/scoring 0 11/21/22 29839 0     11/21/22 98469 0   Diagnostic Interview  30 11/21/22 51191 1   Psychometrician Time-Test Administration/Score 469 11/21/22 73816 1     11/22/22 95304 12   Diagnosis R41.840 F81.2 F84.0

## 2022-11-21 NOTE — LETTER
2022      RE: Quentin Shen  [id number]  [place] [address]   MR#: [id number]  : 2000  DOS: 2022      NEUROPSYCHOLOGICAL CONSULTATION      REASON FOR REFERRAL:  Quentin Shen is a 22 year old male with 12 years of education. The patient was referred for a neuropsychological evaluation by Dr. Amandeep Boykin from [facility] to assess his cognitive and emotional functioning secondary to memory and learning concerns. The evaluation was requested in order to document his current functioning, assist with the differential diagnosis, and provide appropriate recommendations. The patient was informed that the evaluation included multiple measures of performance and symptom validity, assist with the differential diagnosis, and provide appropriate recommendations. The patient was informed that the evaluation included multiple measures of performance and symptom validity, and he was encouraged to provide his best effort at all times.  The nature of the neuropsychological evaluation was also discussed, including limits of confidentiality (for suspected child or elder abuse, potential homicide or suicide, and court orders). The patient was also informed that the report would be placed on the electronic medical records system.  The patient was also given an opportunity to ask questions. The patient indicated that he understood the information and consented to participate in the evaluation.    PROCEDURES:   Review of records and clinical interview,  Mental Status-orientation, Wechsler Adult Intelligence Scale-IV, Clock Drawing Test, Verbal Fluency Test, Hung Depression Inventory, Hung Anxiety Inventory, Personality Assessment Inventory, Xiang Complex Figure Test, Trailmaking Test, NAB Naming Test, TOMM, Judgment of Line Orientation Test, Stroop Test, Wechsler Memory Scale-IV (selected subtests), Dot Counting Test, [place] Card Sorting Test, Wechsler Individual Achievement Test-III  "and [place] Verbal Learning Test-3    REVIEW OF RECORDS: Records from 8/10/21 noted a significant medical history for mood disorder, anxiety, autism, and attention deficit-hyperactivity disorder (ADHD). Medical records from 11/18/2021 noted that he also had a significant premorbid history for albuminuria, and he is followed by psychiatry for ADHD, adjustment reaction, unspecified mood disorder, and pervasive developmental disorder.  The report noted he had \"moderate control\" of his type 1 diabetes.  Records indicated the patient is being treated with Flovent, insulin, Cymbalta, Vyvanse, albuterol, dextroamphetamine, glucagon, and NovoLog.  No neuroimaging scan of the brain reports were found in the records. Records from 8/4/2021 also noted diagnoses of \"higher functioning\" autism as well as ADHD, anxiety, mood disorder and type 1 diabetes.  Report noted he required an updated evaluation for diagnostic clarification and appropriate community/[facility] supports and services. Records from 8/12/22 noted that he was followed for type 1 diabetes. These records noted he was invovled in special education for communication and math, and was otherwise in general classes in his individual education plan (IEP). 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 These records noted a history of pervasive developmental disorder, mood disorder, proteinuria, ADHD, adjustment reaction, anxiety, asthma, and appendicitis.     CLINICAL INTERVIEW: The patient was [facility] via video platform to [facility]) and he was interviewed with his grandmother, Mirta.  On interview, the patient and his grandmother noted that his referring provider requested the neuropsychological evaluation as he is transitioning from child to adult health care providers.  They noted he had a previous neuropsychological evaluation at approximately age 9, but no records of this evaluation " "were found in the electronic medical records.  His grandmother said that currently he feels he does not have difficulties, which the patient confirmed.  She noted that he is able to drive and during the summer when he was not in school he worked two full-time jobs.  She noted sometimes he has difficulty with attention, and he is in school to become an .  The grandmother also noted that he was previously diagnosed with \"slight autism\" but she was surprised to see diagnoses of ADHD and depression in his medical records.  She reported that he has episodes where he feels sad, which the patient confirmed.  She noted that the length of these episodes depends upon what is happening to him, and will last from a couple days to a week.  They noted the patient has been on medication for ADHD since age 9, and is currently on Vyvanse.  The patient reported that his attention is doing well, although he is slow at times to complete schoolwork and procrastinates.  He also acknowledged becoming distracted at times, although in spite of this he is doing well in his training program.  His grandmother indicated she was uncertain how much his medications contributed to the cycling of his mood.  She reported that the medications typically wear off about 4:00 PM and between 4 and 7:00 PM is his \"ochoa time\".  However, she said that since he is now living in a more stable environment this is not as much an issue.  She also noted that his provider prescribed a medication that he takes about 4:00 PM because of these mood difficulties.    The patient reported that his mood is currently \"okay\" and denied significant anxiety.  His grandmother noted that he tends to be more apprehensive in the morning, which the patient confirmed.  She elaborated that his medications \"play a big role in his day.\"  The patient denied difficulty with sleep.  However his grandmother said that when he gets home from school he will take a 1-2-hour nap " "often, which disrupts his sleep cycle.  He reported that his appetite was \"good\" and his grandmother noted he has a 40-50 pound weight gain during the course of the pandemic.  The patient acknowledges and indicated that he had stopped exercising and also his diet was poor.  The patient noted that he has been working with psychiatric providers since age 9.  He denied any suicidal or homicidal ideation, denied any history of suicide attempts.  He also denied any hallucinations or delusions.  The patient denied any use of alcohol, tobacco, or illicit substances.  In particular, he said he does not drink alcohol because of his diabetes.    In terms of medical issues, they confirmed his history of type 1 diabetes and hypertension.  They also reported that he recently began taking lisinopril for his high blood pressure.  His grandmother indicated that is blood glucose levels are not well managed and his last hemoglobin A1c was 9.1.  She indicated that they are interested in having him see adult endocrinology and a dietitian for these issues.  The patient denied any history of traumatic brain injury with loss of consciousness.  However, his grandmother noted that he had 3 instances where he fell and had \"goose eggs\" on this head but he was never seen by a medical provider.  The patient said he did not lose consciousness in any of these incidents.  They also noted that he was diagnosed with COVID-19 in May, 2022 but he was not hospitalized.  The patient denied any history of multiple sclerosis, stroke, seizures, Parkinson's disease, sleep apnea, hypothyroidism, heart disease, cancer, or liver disease.  They noted he has had some kidney issues that are likely related to his diabetes, but he is not receiving active treatment for this.  He also noted he recently got eyeglasses for reading, but he denied any hearing problems.  The patient also noted a family history of thyroid problems in his mother.    Academically, they " confirmed the patient had an individual education plan (IEP) for math and communication in school, and that math continues to be a weakness for him.  However, they indicated that he is doing significantly better socially in his vocational training program than he did in high school.  The patient confirmed this and said he did not like high school, but he enjoys college a great deal.  He denied ever having to repeat a grade.  He reported that he is in the second year of a 2-year training program to become an , and then will do a separate one year training program to become an .  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  He reported that he is single, has never been , and has no children.  He noted that he currently lives with his grandmother.    BEHAVIORAL OBSERVATIONS:  On examination, the patient was casually but appropriately dressed and groomed. The patient was cooperative with the evaluation and was talkative.  Speech was normal in volume, rate and tone.  The patient also appeared to put forth their best effort on all tasks. Thus, the results of this evaluation are a reasonably valid reflection of the patient's current level of functioning. There were no indications of hallucinations, delusions or unusual thought processes and the patient was generally well oriented to personal information, place, and time. There were no demonstrations of a practical memory problem. The patient was a good historian, with a self-report consistent with medical records. Affect was also generally appropriate.      RESULTS: Formal performance validity measures were within expected limits and consistent with the above noted observations.  Measures of the patient's verbally mediated intellectual functioning were in the low average range, while visually mediated and functional functioning was in the average range.  Vocabulary and fund of  information were at the lower end of the average range.  Thus, general intellectual functioning was in the low average range.  Academic achievement in reading and written language was generally within expected limits.  Single word reading ability was in the low average range, while reading comprehension was in the average range.  Written spelling was also in the average range.  However, academic achievement in math was variable.  Specifically, academic achievement in mathematical problem-solving was in the low average range, however academic achievement in mathematical operations was poorer and in the exceptionally low range.  Thus, there was evidence for a specific learning disorder in math, which is consistent with the reported history.    Measures of attention/concentration were variable.  For example, mental calculation was in the below average range.  Further, a digit span task was in the low average range.  A computer-based measure of complex and sustained attention was generally within expected limits and significantly better, however.  Speed of information processing was also slower than expected.  For example, psychomotor speed was in the low average range.  A self-report measure of current and childhood memories of attentional difficulties was completed in a valid and interpretable fashion.  On this measure, the patient denied any attentional problems either currently or in childhood.    Measures of the patient's memory functioning were broadly within expected limits.  For example, immediate and delayed verbal recall on a story memory task were in the low average range.  Verbal recognition memory on this task was poorer than expected.  On a word list learning task, initially his performance was below expected limits, however he demonstrated an appropriate learning curve.  Delayed free and cued recall ranged from low average to average.  Verbal recognition memory on this task was in the average range.   Visual memory was poor.  Immediate recall of a complex figure drawing was in the exceptionally low range, but delayed recall was better and in the below average range.  Visual recognition memory was also within expected limits.  Thus, there was no evidence for rapid forgetting of previously learned verbal or visual information.    Expressive language functioning was variable, but this is likely secondary to the varialibty in processing speed rather than language processing deficits.  For example, verbal abstract reasoning was in the average range.  Further, untimed confrontation naming was in the average range.  However, timed verbal fluency tasks, including semantic and phonemic fluency, were in the low average range, likely related to variable processing speed rather than language functioning difficulties.  Receptive language functioning, as assessed by complex ideational material task, was in the average range.    Visual-spatial and visual constructional abilities were generally within expected limits.  For example, motor-free line orientation judgment was within expected limits.  Further, motor-free visual reasoning was in the average range.  Likewise, a block construction task, a measure of visual-motor integration, was in the average range.  There is also no evidence for significant visual-spatial distortions on clock drawing or complex figure drawing tasks.    Measures of the patient's executive functioning were generally within expected limits as well, except for organizational difficulties on a complex figure drawing task.  A measure of cognitive flexibility and set shifting ability was generally in the average to low average range, and indicated evidence that the patient could adequately utilize feedback in order to alter and improve his performance.  Likewise, a measure of response inhibition that integrates processing speed was in the average range.  A complex visual scanning test that integrates  cognitive flexibility was in the below average range, but consistent with a simpler version that integrated only attention. For example, verbal and visual reasoning abilities were in the average range.    Formal personality evaluation was completed utilizing the clinical interview, an objective measure of emotional functioning, and self-report measures of depression and anxiety.  On the objective measure of emotional functioning, the patient responded similarly to individuals who tend to portray themselves as being exceptionally free of common shortcomings to which most individuals will admit, and these individuals are often quite reluctant to admit to minor faults.  Given this high level of defensiveness, this measure could not be interpreted.  He did not endorse any emotional distress on this measure.    SUMMARY:  In summary, the neuropsychological assessment results indicated no evidence for significant change or decline in global cognitive functioning.  Results indicated that verbally mediated intellectual functioning was in the low average range, while visually mediated intellectual functioning was slightly higher and in the average range.  The patient did not endorse current or childhood symptoms of ADHD, and while there was mild variability in attention, organizational difficulties, and slowed processing speed, the results did not indicate convincing evidence for current or historical symptoms that meet criteria for ADHD.  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  In contrast, academic achievement in reading and written spelling was consistent with his general intellectual functioning.  He also did not endorse any significant emotional distress, and generally had a tendency to respond in any defensive manner on an objective measure of emotional functioning.  Additionally, while a formal autism evaluation was outside the scope of this evaluation, the records and his  grandmother both noted a history of a diagnosis of high functioning autism spectrum disorder (ASD).    RECOMMENDATIONS:   1. Continued psychiatric consultation to address medication management is recommended.  If it is decided to transition the patient to an adult focused psychiatric provider, the [facility] Department of Psychiatry is one option for this service at [email or web address] or [phone].  2. A referral for psychotherapeutic intervention is also recommended. A highly structured cognitive-behavioral intervention focused on coping and stress management would likely be the most beneficial.  Given his difficulties managing diabetes, working with a health psychologist may be particularly beneficial, given their expertise in assisting individuals with chronic health conditions.  One option for this service is through the [facility] at [email or web address] or [phone].  Student:  3. Given the reported significant difficulties in managing his medical history of type 1 diabetes, a referral for endocrinology is recommended.  4. Related, a referral to a dietitian may be beneficial in assisting him in nutritional management of his type 1 diabetes.  5. The patient is also encouraged to consult with his health care providers to engage in regular exercise as medically indicated.  6. Given the reported sleep difficulties, it is recommended that sleep hygiene be addressed to improve the quality and duration of sleep. The following are recommendations from the National Sleep Foundation that may be helpful:     Avoid napping during the day; it can disturb the normal pattern of sleep and wakefulness.    Avoid stimulants such as caffeine, nicotine, and alcohol too close to bedtime. While alcohol is well known to speed the onset of sleep, it disrupts sleep in the second half as the  body begins to metabolize the alcohol, causing arousal.    Exercise can promote good sleep. Vigorous exercise should be taken in the morning or late afternoon. A relaxing exercise, like yoga, can be done before bed to help initiate a restful night's sleep.    Food can be disruptive right before sleep; stay away from large meals close to bedtime. Also dietary changes can cause sleep problems, if someone is struggling with a sleep problem, it's not a good time to start experimenting with spicy dishes. And, remember, chocolate has caffeine.    Ensure adequate exposure to natural light. This is particularly important for older people who may not venture outside as frequently as children and adults. Light exposure helps maintain a healthy sleep-wake cycle.    Establish a regular relaxing bedtime routine. Try to avoid emotionally upsetting conversations and activities before trying to go to sleep. Don't dwell on, or bring your problems to bed.    Associate your bed with sleep. It's not a good idea to use your bed to watch TV, listen to the radio, or read.    Make sure that the sleep environment is pleasant and relaxing. The bed should be comfortable, the room should not be too hot or cold, or too bright.  7. Given his variable attention and processing speed, the patient may find the following attention and organizational strategies helpful:     Use cell phone reminders to help monitor upcoming appointments and due dates as well as other important tasks (e.g., when to take medications). Set the reminder to go off several times prior to the event with advanced notice (e.g., one week before, two days before, the day before, and the morning of the event).     He should attempt to reduce distractions at home. Having a clutter-free work environment and removing or at least making it harder to access potential distractions would help optimize his attention. He might also consider facing away from high traffic areas and using  earplugs or noise cancellation headphones when desiring a quiet work environment.    Taking short breaks during his day may help optimize and maintain his concentration.     It is common for individuals like the patient who struggle with inattention to develop procrastination habits. He may find it useful to evaluate whether he is procrastinating on a task because he considers it to be overwhelming. If so, he may find it useful to break the task down into more manageable components or steps.    Make to-do lists and prioritize tasks. Break down large tasks into smaller steps and check off each small step when completed.   8. Given the variable attention and slowed processing speed, it is recommended that the patient optimize his environment to aid his attention and focus as well as compensate for variable processing speed. This includes:    Sit in the front of the room during lectures to minimize distractions.    Label, highlight, underline and add color to directions on assignments.     Have class notes printed and reviewed in advance.    If notes are not available, having the professor provide them in advance.     Record class lectures so they can be reviewed at a later time and at a pace that is comfortable for the patient.     The patient is encouraged to seek out a  for additional assistance with study skills and organizational abilities.   9. In order to promote learning and memorization of new verbal material, it is recommended that the patient add structure to the material he is learning to promote subsequent recall (e.g., use mnemonic devices, acronyms, make up a story or song). Additionally, he is encouraged to use visual aids, such as flash cards, diagrams, charts, etc.   10.  Given the evidence for a specific learning disorder in mathematics, a referral for tutoring services is recommended for any math focused classes he has to take.  In addition, allowing additional time to complete tests and  assignments in math oriented classes is recommended.  11.  If a formal updated diagnosis for autism spectrum disorder (ASD) is required, referral to specialist in adult ASD is recommended as this differential diagnosis is outside the scope of this neuropsychological evaluation.    12. The results of the evaluation indicate that he generally has adequate capacity for informed personal and medical decision making. However, family assistance with complex decision making is recommended.    13. The results of the evaluation now constitute a baseline of the patient s cognitive functioning.  If cognitive difficulties become a concern in the future, a referral for a neuropsychological reevaluation at that time might be considered.    I attempted to contact the patient via telephone on 11/22/2022 to discuss the results of the evaluation.  Unfortunately, the phone went to [place] each time, so I sent the patient a message through [employer].  I encouraged him to follow-up with his referring healthcare providers to facilitate the recommendations noted in this report.  Thank you for referring this interesting individual. If you have any questions, please feel free to contact me.      Ranulfo Wise, PhD, ABPP, LP  Professor and Licensed Psychologist [id number]  Board Certified Clinical Neuropsychologist    Time Spent:      Minutes Date Code Units   Total Time-Neuropsychologist Professional 293 11/21/22 66156 1     11/22/22 18334 4   Neuropsychologist Admin/scoring 0 11/21/22 92456 0     11/21/22 07838 0   Diagnostic Interview  30 11/21/22 04696 1   Psychometrician Time-Test Administration/Score 469 11/21/22 67874 1     11/22/22 18076 12   Diagnosis R41.840 F81.2 F84.0

## 2022-11-22 NOTE — NURSING NOTE
Pt was seen for neuropsychological evaluation at the request of Amandeep Boykin MD for the purposes of diagnostic clarification and treatment planning. 469 minutes of test administration and scoring were provided by this writer. Please see Dr. Ranulfo Wise's report for a full interpretation of the findings.    Nora Ness  Psychometrist

## 2022-11-22 NOTE — PROGRESS NOTES
NAME  Quentin Shen     MRN  [id number]       2      AGE  1      SEX  Male      HANDEDNESS Right      EDUCATION 12      COTO  22      PROVIDER        [employer]  SW      STATION  OP              ORIENTATION       Time  0      Personal Info.      Place       Presidents               TOMM        Trial 1  44      Trial 2  50              WAIS-IV        Digit Span RDS 8              WAIS-IV          Raw SS     Similarities  20 8     Vocabulary 25 8     Information 10 8     Block Design  39 8     Matrix Reasoning 17 9     Visual Puzzles 15 9     Digit Span  20 6     Arithmetic  8 5     Symbol Search 23 6     Coding  52 6             VCI:  89 INGRID: 92     WMI: 74 PSI: 79     FSIQ: 81               WIAT-III          Raw SS GE    Reading Comprehension 38 97 10.2    Math Problem Solving 48 83 5.9    Word Reading 91 80 6.7    Pseudoword Decoding 35 87 6.8    Numerical Operations 26 74 3.9    Spelling  50 100 >12.9            CPT-III       Variable type Measure T-Score Guideline    Detectability d' 48 Average    Error Type  Omissions 48 Average      Comissions 45 Average      Perseverations 45 Average    Reaction Time Statistics HRT 54 Average      HRT SD 47 Average      Variability 38 Low      HRT Block Change 38 Low      HRT ERIKA Change 38 Low                     TRAIL MAKING TEST        Time Errors SSa T    A 40 1 7 31    B 134 0 6 26            CVLT-III    STND      Raw T     Trials 1-5 Total  35 38     SD Free Recall 9 47     SD Cued Recall 10 47     LD Free Recall 9 43     LD Cued Recall 8 41     Recognition Hits 14 44     Recognition FP 1 49     Recognition Disc. 3 50             ANTONIO-O COMPLEX FIGURE         Raw T %ile    Time to Copy 600  <1    Copy  27  <1    Immediate Recall 12 <20 <1    30 Minute Recall 16 28 1    Recognition Total 21 46 34            WMS-IV    YA      Raw SSa/%ile     LM I  19 7     LM II  15 7     LM Recognition 20 3-9                     NAB NAMING   1    Raw        z -0.41       T 45               COWAT FAS        Raw 25       SS 6       T 32               ANIMAL FLUENCY       Raw 14       SS 7       T 30               COMPLEX IDEATION       Raw 11       SS 9       T 46               COLBY    H    Raw 25       %ile 56               CLOCK DRAWING       Command:  NML      Copy:  NML              STROOP         Raw T      Word 77 32      Color 61 36      C/W 32 38      Intf. -1 49              [place] CARD SORTING TEST        Raw T %ile    # Categories 3  >16    Total Correct 42      Total Errors 22 42 21    Perseverative Err. 16 37 10    % Concept Resp. 36 42 21    FTMS:  0      LTL  -20.78  6-10            REENA                        CAT-A        Childhood Memories Raw T-Score %ile    Inattention (ATT) 43 53 58    Impuslivity (IMP) 44 52 57    Hyperactivity (HYP) 47 50 54    Childhood Memories Clinical Index 134 52 57    Personal (PER) 45 52 56    Academic/Occupational (A/O) 44 51 54    Social (SOC) 45 53 63    Internal (INT) 66 48 44    External (EXT) 68 55 68            Current Symptoms Raw T-Score %ile    Inattention (ATT) 41 53 63    Impuslivity (IMP) 39 49 46    Hyperactivity (HYP) 46 57 76    Current Symptoms Clinical Index 126 54 66    Personal (PER) 46 58 80    Academic/Occupational (A/O) 38 45 29    Social (SOC) 42 55 74    Internal (INT) 66 54 64    External (EXT) 60 53 64            CAT-A Clinical Index 260 52 59            Negative Impresion 5      Infrequency 1      Positive Impression 7

## 2022-12-02 ENCOUNTER — TRANSFERRED RECORDS (OUTPATIENT)
Dept: HEALTH INFORMATION MANAGEMENT | Facility: CLINIC | Age: 22
End: 2022-12-02

## 2023-01-02 ENCOUNTER — TELEPHONE (OUTPATIENT)
Dept: NEUROPSYCHOLOGY | Facility: CLINIC | Age: 23
End: 2023-01-02

## 2023-01-02 NOTE — TELEPHONE ENCOUNTER
The referring provider called me and I returned the call, but the answering service did not have his contact information. I then sent this email to the referring provider:    Hello    I received your message you called concerning a mutual patient-QG ( [birth year]) for whom I conducted a neuropsychological evaluation on 22..  I called the number your staff left, but they said they could not reach you. So I thought I could just send you this email. What questions did you have about the evaluation results?    Best regards,    Ranulfo Wise, PhD, ABPP/CN, LP    This is the original message:    [facility]     Phone Message     May a detailed message be left on voicemail: yes      Reason for Call: Other: Provider Amandeep Boykin at [facility] wanted to discuss patient's recent testing with provider. Please call back at [phone] or email [email or web address]      Action Taken: Other: Neuropsychology     Travel Screening: Not Applicable

## 2023-01-03 NOTE — PROGRESS NOTES
The referring psychiatrist sent me the following email concerning the patient:    hi,  -i didn't receive the actual numbers/scores in the report that was sent to me.  -also, i'm concerned about if he pursues county supports/disability services, not much in the report to support this (social skills challenges, role for vocational supports/coaching, etc...).      happy to discuss by phone.  i'm free 12:30 - 1:30, 3:00 - 3:30, or after 5:15 today.    thanks,  luz morgan md  I replied:    Hi!  Thank you for the email. As a division policy, we do not release the raw test scores except to licensed psychologists so the scores are not included in the report.     As for support services, the results of the evaluation did not really support a lot of support services so they weren't included in the recommendations. If you are referring to issues in the area of ASD, as I discussed with the patient when he came for the evaluation, this is outside the scope of the neuropsychological evaluation and I am not an expert in adult ASD, so this was not my diagnosis (but I did note the reported history). It's possible that there is more need for services from the ASD perspective, which is outside the scope of this evaluation. Perhaps a referral to a provider with expertise in adult ASD would be helpful there.  This type of evaluation is not done by our division, however.  There are some community providers who may offer this:    Nicolasa Woods, Ph.D.  [facility]  [phone]    Vishnu Ames, Ph.D.  [phone]    Bridgett Wellington, PhD  [phone]    Brittany Crump, PhD  [phone]    Gissel Pedro, Ph.D.  [phone]    I'm not sure of the scope of their practices, but this is a starting point.    I hope this is helpful    Best regards,    Terrell Wise, PhD, ABPP/HARI, LP  Professor of Rehabilitation Medicine and   Board Certified Clinical Neuropsychologist  [facility] Department of  Rehabilitation Medicine  [facility] [address] [place] [address]

## 2023-01-09 ENCOUNTER — TELEPHONE (OUTPATIENT)
Dept: NEUROPSYCHOLOGY | Facility: CLINIC | Age: 23
End: 2023-01-09

## 2023-01-09 NOTE — TELEPHONE ENCOUNTER
I also sent the following [place] message to the patient:    Dear Mr. Shen  I wanted to let you know that your grandmother has called to ask about the results. I do not see a release of information to speak with her. I asked our call center to call your grandmother back and contact your treating providers. As I said when we talked, I think coordinating with them is the best option to facilitate the next steps.    Thank you very much,    Ranulfo Wise, PhD, [facility]    Further, I called the patient again to discuss this.  Unfortunately, the phone call went to voicemail again.  I left a message explaining that his grandmother was contact me but I did not have a release to speak with her.  I suggested that they follow-up with the referring psychiatrist to Facilitate the recommendations.    After this, I found the FIORELLA in the records that included his grandmother, so with the written permission I called her.  During the call, the grandmother expressed frustration with multiple rescheduling and complaints about the scheduling team, of which I had no knowledge.  I provided her the name of Vicky Poe to answer any questions or concerns about the scheduling and  people [facility].  The grandmother also said that the psychometrist had promised her a hard copy of the report, but I was not informed of this.  I apologized for the miscommunication, and said I could arrange to have the report mailed to her.  The grandmother indicated that the referring psychiatrist refused to discuss the results of the evaluation and referred her back to me.  With the release, I then discussed the results.  The grandmother indicated they thought this evaluation was for autism spectrum disorder, but I reminded her that when we originally met I indicated the primary evaluation included ADHD and learning disorders, but I am not an expert on autism so this was outside the scope of my evaluation.  I told her that I  provided the psychiatrist with several recommendations for possible community experts and suggested she follow up with the psychiatrist.    I then consulted about this with my colleague, Dr. Heredia, and he agreed that I have done everything appropriately to address the concerns of the grandmother. I then requested a copy of the report be sent to the patient in the mail.      Ranulfo Wise, PhD, ABPP

## 2023-01-09 NOTE — TELEPHONE ENCOUNTER
I received this message (below) and asked the call center to return the call and let them know that we don't have an FIORELLA in the records. I asked the call center to have her contact the referring providers. The message is below:    Hi  Please call the grandmother back and inform her that we don't have an FIORELLA in the records, so I cannot speak with her. Please refer her to his treating healthcare and psychiatric providers and they should be able to help her out.    Thanks  Terrell      ===View-only below this line===  ----- Message -----  From: Cinthia Mendes  Sent: 1/9/2023   2:47 PM CST  To: Ranulfo Wise, PhD  Subject: FW:                                              Hello,    Patient's Grandmother is still awaiting a phone call to go over the findings from the eval. Please call Ruthy.    Thank You    1/9/23 BD      ----- Message -----  From: Michelle Cardenas  Sent: 1/9/2023  12:28 PM CST  To: [id number]  Subject: FW:                                              See message below  ----- Message -----  From: Ranulfo Wise, PhD  Sent: 12/30/2022  10:05 AM CST  To: Michelle Cardenas  Subject: FW:                                              Matthew Martinez  Would you or someone send this on to medical records to address? Or would someone call them and let them know to contact medical records?    Filipe Tejada

## 2023-05-13 ENCOUNTER — HEALTH MAINTENANCE LETTER (OUTPATIENT)
Age: 23
End: 2023-05-13

## 2023-08-06 ENCOUNTER — HEALTH MAINTENANCE LETTER (OUTPATIENT)
Age: 23
End: 2023-08-06

## 2023-12-23 ENCOUNTER — HEALTH MAINTENANCE LETTER (OUTPATIENT)
Age: 23
End: 2023-12-23

## 2024-07-20 ENCOUNTER — HEALTH MAINTENANCE LETTER (OUTPATIENT)
Age: 24
End: 2024-07-20

## 2025-02-15 ENCOUNTER — HEALTH MAINTENANCE LETTER (OUTPATIENT)
Age: 25
End: 2025-02-15

## 2025-03-15 ENCOUNTER — HEALTH MAINTENANCE LETTER (OUTPATIENT)
Age: 25
End: 2025-03-15

## 2025-08-30 ENCOUNTER — HEALTH MAINTENANCE LETTER (OUTPATIENT)
Age: 25
End: 2025-08-30